# Patient Record
Sex: FEMALE | Race: ASIAN | NOT HISPANIC OR LATINO
[De-identification: names, ages, dates, MRNs, and addresses within clinical notes are randomized per-mention and may not be internally consistent; named-entity substitution may affect disease eponyms.]

---

## 2017-11-28 PROBLEM — Z00.00 ENCOUNTER FOR PREVENTIVE HEALTH EXAMINATION: Status: ACTIVE | Noted: 2017-11-28

## 2018-01-23 ENCOUNTER — APPOINTMENT (OUTPATIENT)
Dept: GASTROENTEROLOGY | Facility: CLINIC | Age: 68
End: 2018-01-23
Payer: MEDICARE

## 2018-01-23 VITALS
TEMPERATURE: 98.1 F | DIASTOLIC BLOOD PRESSURE: 70 MMHG | SYSTOLIC BLOOD PRESSURE: 130 MMHG | WEIGHT: 206 LBS | HEIGHT: 64 IN | BODY MASS INDEX: 35.17 KG/M2

## 2018-01-23 DIAGNOSIS — Z78.9 OTHER SPECIFIED HEALTH STATUS: ICD-10-CM

## 2018-01-23 DIAGNOSIS — Z86.39 PERSONAL HISTORY OF OTHER ENDOCRINE, NUTRITIONAL AND METABOLIC DISEASE: ICD-10-CM

## 2018-01-23 DIAGNOSIS — Z12.11 ENCOUNTER FOR SCREENING FOR MALIGNANT NEOPLASM OF COLON: ICD-10-CM

## 2018-01-23 DIAGNOSIS — Z87.19 PERSONAL HISTORY OF OTHER DISEASES OF THE DIGESTIVE SYSTEM: ICD-10-CM

## 2018-01-23 DIAGNOSIS — Z82.49 FAMILY HISTORY OF ISCHEMIC HEART DISEASE AND OTHER DISEASES OF THE CIRCULATORY SYSTEM: ICD-10-CM

## 2018-01-23 DIAGNOSIS — R10.9 UNSPECIFIED ABDOMINAL PAIN: ICD-10-CM

## 2018-01-23 DIAGNOSIS — F15.90 OTHER STIMULANT USE, UNSPECIFIED, UNCOMPLICATED: ICD-10-CM

## 2018-01-23 DIAGNOSIS — Z86.79 PERSONAL HISTORY OF OTHER DISEASES OF THE CIRCULATORY SYSTEM: ICD-10-CM

## 2018-01-23 DIAGNOSIS — K59.00 CONSTIPATION, UNSPECIFIED: ICD-10-CM

## 2018-01-23 DIAGNOSIS — Z83.3 FAMILY HISTORY OF DIABETES MELLITUS: ICD-10-CM

## 2018-01-23 PROCEDURE — 99204 OFFICE O/P NEW MOD 45 MIN: CPT

## 2018-01-23 RX ORDER — METFORMIN HYDROCHLORIDE 500 MG/1
500 TABLET, COATED ORAL DAILY
Refills: 0 | Status: ACTIVE | COMMUNITY

## 2018-01-23 RX ORDER — LEVOTHYROXINE SODIUM 0.12 MG/1
125 TABLET ORAL DAILY
Refills: 0 | Status: ACTIVE | COMMUNITY

## 2018-01-31 LAB — HEMOCCULT STL QL IA: NEGATIVE

## 2018-02-15 ENCOUNTER — APPOINTMENT (OUTPATIENT)
Dept: GASTROENTEROLOGY | Facility: AMBULATORY MEDICAL SERVICES | Age: 68
End: 2018-02-15
Payer: MEDICARE

## 2018-02-15 PROCEDURE — 45380 COLONOSCOPY AND BIOPSY: CPT

## 2018-02-15 PROCEDURE — 43239 EGD BIOPSY SINGLE/MULTIPLE: CPT

## 2018-03-27 ENCOUNTER — APPOINTMENT (OUTPATIENT)
Dept: GASTROENTEROLOGY | Facility: CLINIC | Age: 68
End: 2018-03-27
Payer: MEDICARE

## 2018-03-27 ENCOUNTER — APPOINTMENT (OUTPATIENT)
Dept: GASTROENTEROLOGY | Facility: CLINIC | Age: 68
End: 2018-03-27

## 2018-03-27 VITALS
DIASTOLIC BLOOD PRESSURE: 80 MMHG | BODY MASS INDEX: 35.17 KG/M2 | TEMPERATURE: 98.2 F | SYSTOLIC BLOOD PRESSURE: 140 MMHG | HEIGHT: 64 IN | WEIGHT: 206 LBS

## 2018-03-27 DIAGNOSIS — Z09 ENCOUNTER FOR FOLLOW-UP EXAMINATION AFTER COMPLETED TREATMENT FOR CONDITIONS OTHER THAN MALIGNANT NEOPLASM: ICD-10-CM

## 2018-03-27 DIAGNOSIS — E66.9 OBESITY, UNSPECIFIED: ICD-10-CM

## 2018-03-27 DIAGNOSIS — K21.9 GASTRO-ESOPHAGEAL REFLUX DISEASE W/OUT ESOPHAGITIS: ICD-10-CM

## 2018-03-27 DIAGNOSIS — K63.89 OTHER SPECIFIED DISEASES OF INTESTINE: ICD-10-CM

## 2018-03-27 PROCEDURE — 99214 OFFICE O/P EST MOD 30 MIN: CPT

## 2018-04-22 ENCOUNTER — RX RENEWAL (OUTPATIENT)
Age: 68
End: 2018-04-22

## 2018-05-01 ENCOUNTER — APPOINTMENT (OUTPATIENT)
Dept: GASTROENTEROLOGY | Facility: CLINIC | Age: 68
End: 2018-05-01
Payer: MEDICARE

## 2018-05-01 DIAGNOSIS — D50.9 IRON DEFICIENCY ANEMIA, UNSPECIFIED: ICD-10-CM

## 2018-05-01 PROCEDURE — 91110 GI TRC IMG INTRAL ESOPH-ILE: CPT

## 2018-05-08 PROBLEM — D50.9 IRON DEFICIENCY ANEMIA: Status: ACTIVE | Noted: 2018-01-23

## 2018-08-17 ENCOUNTER — APPOINTMENT (OUTPATIENT)
Dept: SURGERY | Facility: CLINIC | Age: 68
End: 2018-08-17
Payer: MEDICARE

## 2018-08-17 VITALS
HEART RATE: 71 BPM | HEIGHT: 64 IN | BODY MASS INDEX: 35 KG/M2 | TEMPERATURE: 98.2 F | WEIGHT: 205 LBS | OXYGEN SATURATION: 95 %

## 2018-08-17 DIAGNOSIS — Z86.39 PERSONAL HISTORY OF OTHER ENDOCRINE, NUTRITIONAL AND METABOLIC DISEASE: ICD-10-CM

## 2018-08-17 PROCEDURE — 99204 OFFICE O/P NEW MOD 45 MIN: CPT

## 2018-08-27 ENCOUNTER — OUTPATIENT (OUTPATIENT)
Dept: OUTPATIENT SERVICES | Facility: HOSPITAL | Age: 68
LOS: 1 days | Discharge: HOME | End: 2018-08-27

## 2018-08-27 ENCOUNTER — RESULT REVIEW (OUTPATIENT)
Age: 68
End: 2018-08-27

## 2018-08-27 ENCOUNTER — APPOINTMENT (OUTPATIENT)
Dept: SURGERY | Facility: CLINIC | Age: 68
End: 2018-08-27
Payer: MEDICARE

## 2018-08-27 DIAGNOSIS — E04.1 NONTOXIC SINGLE THYROID NODULE: ICD-10-CM

## 2018-08-27 PROCEDURE — 76942 ECHO GUIDE FOR BIOPSY: CPT

## 2018-08-27 PROCEDURE — 10022: CPT

## 2018-08-31 ENCOUNTER — RESULT REVIEW (OUTPATIENT)
Age: 68
End: 2018-08-31

## 2019-08-26 ENCOUNTER — APPOINTMENT (OUTPATIENT)
Dept: SURGERY | Facility: CLINIC | Age: 69
End: 2019-08-26
Payer: MEDICARE

## 2019-09-02 PROBLEM — Z09 FOLLOW UP: Status: ACTIVE | Noted: 2018-03-27

## 2019-09-06 ENCOUNTER — APPOINTMENT (OUTPATIENT)
Dept: SURGERY | Facility: CLINIC | Age: 69
End: 2019-09-06
Payer: MEDICARE

## 2019-09-06 DIAGNOSIS — E04.1 NONTOXIC SINGLE THYROID NODULE: ICD-10-CM

## 2019-09-06 DIAGNOSIS — Z95.5 PRESENCE OF CORONARY ANGIOPLASTY IMPLANT AND GRAFT: ICD-10-CM

## 2019-09-06 PROCEDURE — 99213 OFFICE O/P EST LOW 20 MIN: CPT

## 2019-09-06 NOTE — PHYSICAL EXAM
[Midline] : located in midline position [Normal] : orientation to person, place, and time: normal [de-identified] : voice clear

## 2020-02-11 ENCOUNTER — APPOINTMENT (OUTPATIENT)
Dept: VASCULAR SURGERY | Facility: CLINIC | Age: 70
End: 2020-02-11
Payer: MEDICARE

## 2020-02-11 VITALS — DIASTOLIC BLOOD PRESSURE: 73 MMHG | SYSTOLIC BLOOD PRESSURE: 137 MMHG | HEART RATE: 74 BPM

## 2020-02-11 DIAGNOSIS — Z86.79 PERSONAL HISTORY OF OTHER DISEASES OF THE CIRCULATORY SYSTEM: ICD-10-CM

## 2020-02-11 DIAGNOSIS — Z86.39 PERSONAL HISTORY OF OTHER ENDOCRINE, NUTRITIONAL AND METABOLIC DISEASE: ICD-10-CM

## 2020-02-11 PROCEDURE — 99204 OFFICE O/P NEW MOD 45 MIN: CPT

## 2020-02-11 PROCEDURE — 93880 EXTRACRANIAL BILAT STUDY: CPT

## 2020-02-11 RX ORDER — METFORMIN ER 500 MG 500 MG/1
500 TABLET ORAL
Qty: 30 | Refills: 0 | Status: DISCONTINUED | COMMUNITY
Start: 2017-11-30 | End: 2020-02-11

## 2020-02-11 RX ORDER — ATORVASTATIN CALCIUM 20 MG/1
20 TABLET, FILM COATED ORAL
Refills: 0 | Status: ACTIVE | COMMUNITY

## 2020-02-11 RX ORDER — DILTIAZEM HYDROCHLORIDE 120 MG/1
120 CAPSULE, EXTENDED RELEASE ORAL
Qty: 90 | Refills: 0 | Status: DISCONTINUED | COMMUNITY
Start: 2018-02-20 | End: 2020-02-11

## 2020-02-11 RX ORDER — OMEPRAZOLE 20 MG/1
20 CAPSULE, DELAYED RELEASE ORAL
Refills: 0 | Status: ACTIVE | COMMUNITY

## 2020-02-11 RX ORDER — FOLIC ACID 1 MG/1
1 TABLET ORAL
Refills: 0 | Status: ACTIVE | COMMUNITY

## 2020-02-11 RX ORDER — DILTIAZEM HYDROCHLORIDE 120 MG/1
120 TABLET ORAL
Refills: 0 | Status: DISCONTINUED | COMMUNITY
End: 2020-02-11

## 2020-02-11 RX ORDER — CYCLOBENZAPRINE HYDROCHLORIDE 10 MG/1
10 TABLET, FILM COATED ORAL
Refills: 0 | Status: DISCONTINUED | COMMUNITY
End: 2020-02-11

## 2020-02-11 RX ORDER — PREGABALIN 50 MG/1
50 CAPSULE ORAL
Qty: 30 | Refills: 0 | Status: DISCONTINUED | COMMUNITY
Start: 2018-02-19 | End: 2020-02-11

## 2020-02-11 RX ORDER — ASPIRIN ENTERIC COATED TABLETS 81 MG 81 MG/1
81 TABLET, DELAYED RELEASE ORAL
Refills: 0 | Status: ACTIVE | COMMUNITY

## 2020-02-11 RX ORDER — DULAGLUTIDE 1.5 MG/.5ML
1.5 INJECTION, SOLUTION SUBCUTANEOUS
Refills: 0 | Status: ACTIVE | COMMUNITY

## 2020-02-11 RX ORDER — SERTRALINE HYDROCHLORIDE 50 MG/1
50 TABLET, FILM COATED ORAL DAILY
Refills: 0 | Status: DISCONTINUED | COMMUNITY
End: 2020-02-11

## 2020-02-11 RX ORDER — MELOXICAM 7.5 MG/1
7.5 TABLET ORAL
Refills: 0 | Status: DISCONTINUED | COMMUNITY
End: 2020-02-11

## 2020-02-11 RX ORDER — LOSARTAN POTASSIUM 50 MG/1
50 TABLET, FILM COATED ORAL
Refills: 0 | Status: ACTIVE | COMMUNITY

## 2020-02-11 RX ORDER — IRON POLYSACCHARIDE COMPLEX 150 MG
150 CAPSULE ORAL
Qty: 30 | Refills: 5 | Status: DISCONTINUED | COMMUNITY
Start: 2018-01-23 | End: 2020-02-11

## 2020-02-11 RX ORDER — NEBIVOLOL HYDROCHLORIDE 10 MG/1
10 TABLET ORAL
Refills: 0 | Status: ACTIVE | COMMUNITY

## 2020-02-11 RX ORDER — OMEPRAZOLE 20 MG/1
20 CAPSULE, DELAYED RELEASE ORAL DAILY
Qty: 30 | Refills: 0 | Status: DISCONTINUED | COMMUNITY
End: 2020-02-11

## 2020-02-11 RX ORDER — LABETALOL HYDROCHLORIDE 300 MG/1
300 TABLET, FILM COATED ORAL
Qty: 60 | Refills: 0 | Status: DISCONTINUED | COMMUNITY
Start: 2017-05-31 | End: 2020-02-11

## 2020-02-28 NOTE — PHYSICAL EXAM
[Normal Breath Sounds] : Normal breath sounds [Respiratory Effort] : normal respiratory effort [Normal Heart Sounds] : normal heart sounds [Normal Rate and Rhythm] : normal rate and rhythm [2+] : left 2+ [No Rash or Lesion] : No rash or lesion [Alert] : alert [Oriented to Person] : oriented to person [Oriented to Place] : oriented to place [Oriented to Time] : oriented to time [Calm] : calm [JVD] : no jugular venous distention  [Carotid Bruits] : no carotid bruits [Right Carotid Bruit] : no bruit heard over the right carotid [Left Carotid Bruit] : no bruit heard over the left carotid [Ankle Swelling (On Exam)] : not present [de-identified] : calm, cooperative [de-identified] : FROM. Bilateral equal strong hand  5/5. Ambulating without assistive devices, normal gait [de-identified] : Symmetrical frown and smile. PERR [de-identified] : Symmetrical frown and smile.

## 2020-02-28 NOTE — ASSESSMENT
[FreeTextEntry1] : 70 y/o F with bilateral, asymptomatic carotid stenosis. On exam, she is neurological intact. No carotid bruits appreciated and BP well controlled 137/73 with heart rate 73 b/min. Carotid duplex confirmed L side to have <50% stenosis on ICA and CCA, and R side to have 50-69% stenosis. Pt advised to remain on daily asa, plavix and statin as prescribed. In regards to her headaches, it is not related to the stenosis of the carotid arteries. I have referred her to a neurologist, Dr Nabil Anderson who is close to her house. She is instructed to follow-up every 6 months to monitor the stenosis either at  office or here.

## 2020-02-28 NOTE — HISTORY OF PRESENT ILLNESS
[FreeTextEntry1] : 70 y/o F with PMH of HTN, HLD, CAD s/p stents x2, former smoker, and PSH of cervical spine, knee and ovarian surgeries, who presents to office with recently diagnosed carotid stenosis. She reports she started developing headaches about 6 wks ago. The headaches would not go away with any OTC medications or lifestyle modifications, so she went to her PCP Dr WALESKA Matthews for an evaluation. A MRI was ordered and demonstrated her to have some blockage on the carotid arteries, otherwise negative per her report. She was then sent to Dr KELLEY Matthews's office for evaluation of the carotid arteries. An ultrasound was done and demonstrated L side to have mild stenosis and right side moderate stenosis. Subsequently, she was referred here for evaluation. She denies any neurological symptoms besides the headaches. She has a strong family hx of stroke and is concerned about it. She reports her BP to be relatively controlled, max reports SBP 140s. She is on daily plavix, asa, and statin.\par \par She is accompanied by her daughter. \par \par FHx:\par Father: passed, hx of cirrhosis, alcoholism\par Mother: passed, hx of stroke, htn, copd\par Sister: passed, stroke, alcoholism\par Brother: passed, sepsis 2/2 liver abscess, cirrhosis, alcoholism\par Sister: alive, hx of htn\par \par SHx:\par Former 20yrs smoker\par Social alcohol use\par Housewife

## 2020-02-28 NOTE — DATA REVIEWED
[FreeTextEntry1] : 2/3/20: carotid duplex from outside, demonstrates R ICA 60-79% stenosis, mild on the L ICA. See under outside medical records full report

## 2020-12-16 PROBLEM — Z12.11 ENCOUNTER FOR SCREENING COLONOSCOPY: Status: RESOLVED | Noted: 2018-01-23 | Resolved: 2020-12-16

## 2021-06-21 ENCOUNTER — APPOINTMENT (OUTPATIENT)
Dept: ORTHOPEDIC SURGERY | Facility: CLINIC | Age: 71
End: 2021-06-21
Payer: MEDICARE

## 2021-06-21 VITALS — BODY MASS INDEX: 32.44 KG/M2 | WEIGHT: 190 LBS | HEIGHT: 64 IN

## 2021-06-21 VITALS — HEART RATE: 80 BPM | SYSTOLIC BLOOD PRESSURE: 147 MMHG | DIASTOLIC BLOOD PRESSURE: 66 MMHG

## 2021-06-21 PROCEDURE — 73030 X-RAY EXAM OF SHOULDER: CPT | Mod: RT

## 2021-06-21 PROCEDURE — 99203 OFFICE O/P NEW LOW 30 MIN: CPT

## 2021-06-21 PROCEDURE — 99072 ADDL SUPL MATRL&STAF TM PHE: CPT

## 2021-06-21 NOTE — DISCUSSION/SUMMARY
[de-identified] : 69 y/o female with right shoulder pain\par \par Patient presents for evaluation of right shoulder pain which is consistent with rotator cuff disease as well as early arthrosis.  Patient has some loss of motion as well as weakness to the right upper extremity consistent with MRI that shows complete full-thickness tear of the supraspinatus tendon with retraction of the torn free edge of the tendon to the level of the glenohumeral joint line. A discussion was had with the patient regarding degenerative joint disease that results from rotator cuff injury and loss of joint congruence and glenohumeral wear.  This is consistent with rotator cuff arthropathy which is characterized by the combination of rotator cuff insufficiency as well as glenohumeral cartilage destruction and superior migration of the humeral head.  Discussed that progression is common due to the loss of the compressive effects of the rotator cuff as well as progression of functional decline.  Rotator cuff arthropathy tends to limit range of motion as well as pseudoparalysis.\par \par Rotator cuff arthropathy is classified by the acromiohumeral interval; GI >6mm, GII <5mm, GIII + acetabularization of acromion, GIV GH OA with or without acetabularization, GV humeral head collapse.  \par \par Nonoperative management with activity modification, injection therapy, and physical therapy are the first-line treatments.  Physical therapy is focused on scapular and rotator cuff strengthening in an attempt to maximize function and decrease anterior superior escape.  Surgical intervention can include surgical arthroscopy with unpredictable results, or reverse total shoulder arthroplasty for patients that have pseudoparalysis/anterior superior escape with a functioning axillary nerve.\par \par Recommendation: Begin trial of PT, Rx given. Acetaminophen as tolerated, with application of ice to the area 2-3x daily for 20 minutes after periods of activity. \par \par Follow-up 3 months, follow-up orthopedic spine for concurrent cervical dysfunction

## 2021-06-21 NOTE — ADDENDUM
[FreeTextEntry1] : This note was written by Virgen Kerr on 06/21/2021 acting solely as a scribe for Dr. Yobani Galvan.\par \par All medical record entries made by the Scribe were at my, Dr. Yobani Galvan, direction and personally dictated by me on 06/21/2021. I have personally reviewed the chart and agree that the record accurately reflects my personal performance of the history, physical exam, assessment and plan.

## 2021-06-21 NOTE — HISTORY OF PRESENT ILLNESS
[de-identified] : 70 year old female presents today with right shoulder pain since January 2021. She fell and slipped on snow in January on her right side. She was seen by an orthopedic at [facility] a month ago when pain did not resolve. Patient obtained MRI which showed a tear but was unable to follow up because the physician did not accept her insurance. She was referred here by PMD. The pain is intermittent brought on with weather changes and reaching overhead.  She reports the pain localizes in the neck and radiates down the arm.  She also reports sharp right shoulder pain.  NSAIDs cause her GI upset so she is taking Tylenol which gives her temporary relief. \par \par The patient's past medical history, past surgical history, medications and allergies were reviewed by me today with the patient and documented accordingly. In addition, the patient's family and social history, which were noncontributory to this visit, were reviewed also.

## 2021-06-29 ENCOUNTER — APPOINTMENT (OUTPATIENT)
Dept: PHYSICAL MEDICINE AND REHAB | Facility: CLINIC | Age: 71
End: 2021-06-29
Payer: MEDICARE

## 2021-06-29 VITALS
SYSTOLIC BLOOD PRESSURE: 157 MMHG | TEMPERATURE: 97.3 F | HEART RATE: 78 BPM | DIASTOLIC BLOOD PRESSURE: 80 MMHG | OXYGEN SATURATION: 98 %

## 2021-06-29 DIAGNOSIS — M48.02 SPINAL STENOSIS, CERVICAL REGION: ICD-10-CM

## 2021-06-29 DIAGNOSIS — M54.2 CERVICALGIA: ICD-10-CM

## 2021-06-29 DIAGNOSIS — G56.03 CARPAL TUNNEL SYNDROM,BILATERAL UPPER LIMBS: ICD-10-CM

## 2021-06-29 DIAGNOSIS — M54.12 RADICULOPATHY, CERVICAL REGION: ICD-10-CM

## 2021-06-29 PROCEDURE — 99204 OFFICE O/P NEW MOD 45 MIN: CPT

## 2021-06-29 PROCEDURE — 99072 ADDL SUPL MATRL&STAF TM PHE: CPT

## 2021-06-29 NOTE — PHYSICAL EXAM
[FreeTextEntry1] : Gen: NAD\par Neck: supple, +spasm lower (L > R) cervical paraspinals and upper trapezius bilaterally, ROM limited by pain, pos spurling bilaterally\par Right shoulder: ROM limited by pain, +neer's, +hawkin's, neg apprehension, neg speed's, neg drop arm\par CV: no cyanosis\par Pulm: breathing well on room air\par Abd: soft\par Msk: \par 5/5 hip flexion B/L, 5/5 knee extension B/L, 5/5 knee flexion B/L, 5/5 dorsiflexion B/L, 5/5 plantar flexion B/L\par 5/5 shoulder abduction B/L, 5/5 elbow flexion B/L, 5/5 elbow extension B/L, 5/5 wrist extension B/L, 5/5 hand  B/L\par Neuro: sensation intact to light touch in bilateral upper and lower extremities, reflexes 2+ brachioradialis, biceps, triceps bilaterally, reflexes 2+ patella, medial hamstring, achilles bilaterally, negative babinski, negative bauman\par

## 2021-06-29 NOTE — ASSESSMENT
[FreeTextEntry1] : 71 yo F who presents with neck pain with radiation into bilateral upper extremities consistent with cervical radiculopathy, cervicalgia and cervical myofascial pain syndrome.  Radicular symptoms into the hand may be secondary to CTS vs. cervical radiculopathy but this is noted to be a less prominent feature of her pain.\par \par -Orthopedic notes reviewed, MRI right shoulder reviewed\par -Encourage patient to start PT/HEP for right shoulder pain.  I added a referral for neck pain as well, however I've asked the patient to communicate to her PT (outside Health system) that right shoulder pain should be prioritized at this time.\par -Start methocarbamol 500 mg PO qHS prn pain.  Patient warned of the sedating nature of the medication and instructed not to drive or handle heavy machinery.\par -RTC 6 weeks, If pain persists or worsen despite compliance with above, then will consider MRI cervical spine w/o contrast at next visit.\par \par Isac Seth MD\par Spine and Sports Medicine\par \par [employer]\par At [facility]\par \par

## 2021-06-29 NOTE — HISTORY OF PRESENT ILLNESS
[FreeTextEntry1] : 71 yo F with PMH GERD, DM w/ recent HgbA1c 6, HTN, HLD, CAD s/p stent placement, CTS who presents with neck pain.\par \par Onset: 2007 with recent flare after a mechanical fall in 1/2021.  No inciting events, trauma, or falls.\par Location: lower cervical spine\par Characteristics: sharp \par Aggravating factors: neck movements\par Alleviating factors: rest\par Radiation: bilateral upper extremities\par Treatments: tylenol, oxycodone with some relief, Gabapentin 100 mg PO qHS with minimal relief in her pain, rest, no recent physical therapy, HEP; cervical spine fusion surgery in 2007\par Severity: 7-9/10\par \par Diagnostic studies:\par MRI right shoulder showing complete full-thickness tear of the supraspinatus, moderte infraspinatus and subscapularis tendinopathy, complete tear of the bicep tendon.\par No recent MRI cervical spine\par EMG/NCS shows evidence of carpal tunnel syndrome several years ago.\par \par Patient denies new weakness, numbness or paresthesia.  Patient denies bowel/bladder dysfunction, fevers, chills, weight loss, night pain, or night sweats.\par

## 2021-10-04 ENCOUNTER — APPOINTMENT (OUTPATIENT)
Dept: ORTHOPEDIC SURGERY | Facility: CLINIC | Age: 71
End: 2021-10-04
Payer: MEDICARE

## 2021-10-04 PROCEDURE — 99214 OFFICE O/P EST MOD 30 MIN: CPT

## 2021-10-04 NOTE — PHYSICAL EXAM
[de-identified] : Oriented to time, place, person\par Mood: Normal\par Affect: Normal\par Appearance: Healthy, well appearing, no acute distress.\par Gait: Normal\par Assistive Devices: None\par \par Right shoulder exam:\par \par Inspection: No malalignment, No defects, No atrophy\par Skin: No masses, No lesions\par Neck: Negative Spurling, full ROM, no pain with ROM\par AROM: FF to 160, abduction to 90, ER to 45, IR to mid lumbar\par Painful arc ROM: Pain with IR\par Tenderness: No bicipital tenderness, mild tenderness to greater tuberosity/RTC insertion, no anterior shoulder/lesser tuberosity tenderness positive trapezius pain\par Strength: 4/5 ER, 4+/5 IR in adduction, 3/5 supraspinatus testing, +drop arm test negative Clarendon's test\par AC joint: No TTP/pain with cross arm testing\par Biceps: Speed Negative, Yergason Negative \par Impingement test: + Ann, + Neer\par Vasc: 2+ radial pulse \par Stability: Stable \par Neuro: AIN, PIN, Ulnar nerve intact to motor\par Sensation: Intact to light touch throughout  [de-identified] : Images were reviewed from [facility] dated 5.10.2021.\par \par 3 views of right shoulder were obtained that show no acute fracture or dislocation. There is mild glenohumeral and moderate AC joint degenerative change seen. Type II acromion. There is no significant malalignment. No significant other obvious osseous abnormality, otherwise unremarkable. \par \par MRI right shoulder dated 6.4.2021 shows complete full-thickness tear of the supraspinatus tendon with retraction of the torn free edge of the tendon to the level of the glenohumeral joint line. Complete tear of the intra-articular portion of the long head of the biceps tendon.

## 2021-10-04 NOTE — ADDENDUM
[FreeTextEntry1] : This note was written by Virgen Kerr on 10/04/2021 acting solely as a scribe for Dr. Yobani Galvan.\par \par All medical record entries made by the Scribe were at my, Dr. Yobani Galvan, direction and personally dictated by me on 10/04/2021. I have personally reviewed the chart and agree that the record accurately reflects my personal performance of the history, physical exam, assessment and plan.

## 2021-10-04 NOTE — HISTORY OF PRESENT ILLNESS
[de-identified] : 71 year old female presents today for follow up of right shoulder rotator cuff tear. Attending PT 2 x per week without improvement. She fell and slipped on snow in January 2021 on her right side and pain radiating through the upper extremity. Seen PM&R for neck pain she was given PT script for neck and methocarbamol 500 mg. Her pain is intermittent brought on with weather changes and reaching overhead.  She reports sharp right shoulder pain that begins as soon as she starts using it.  NSAIDs cause her GI upset so she is taking Tylenol which gives her temporary relief. Considering alternative options as physical therapy seems to be exacerbating her shoulder symptoms.

## 2021-10-04 NOTE — DISCUSSION/SUMMARY
[de-identified] : 72 y/o female with right shoulder RTCT\par \par Patient presents for follow-up of right shoulder pain which is consistent with rotator cuff disease as well as early arthrosis.  Patient has some loss of motion as well as weakness to the right upper extremity consistent with MRI that shows complete full-thickness tear of the supraspinatus tendon with retraction of the torn free edge of the tendon to the level of the glenohumeral joint line. Patient is frustrated with her progression of conservative management. We discussed early rotator cuff arthropathy given degree of rotator cuff involvement. I discussed continued conservative management versus arthroscopic intervention versus revTSA. I do not believe that she is a candidate for arthroplasty at this time as her arthrosis is not severe. Arthroscopic intervention with attempted rotator cuff repair versus superior capsular reconstruction may provide some improvement of current symptoms, but there is concerned given early arthrosis.\par \par Patient is electing for surgical treatment with arthroscopy and rotator cuff repair.  All risks, benefits and alternatives to the proposed surgical procedure, right shoulder arthroscopy with attempted rotator cuff repair versus SCR, as well as the need for formal post-operative rehabilitation were discussed in great detail with the patient. Risks include but are not limited to pain, bleeding, infection, neurovascular injury, stiffness, failure, loss of motion, future arthroplasty, medical complications (including DVT, PE, MI), and risks of anesthesia. \par \par A discussion was also had with the patient regarding additional precautions during surgery given the recent Covid-19 pandemic; specifically with regards to perioperative care, visitor policy, and pre-admission testing. The patient understands that current protocol requires either documented Covid-19 vaccination or a negative Covid-19 test no more than 48hrs prior to surgery. \par \par The patient expressed understanding and all questions were answered. The patient is electing to proceed, and will have the patient scheduled accordingly.

## 2021-10-08 ENCOUNTER — OUTPATIENT (OUTPATIENT)
Dept: OUTPATIENT SERVICES | Facility: HOSPITAL | Age: 71
LOS: 1 days | End: 2021-10-08
Payer: COMMERCIAL

## 2021-10-08 VITALS
HEART RATE: 81 BPM | HEIGHT: 62 IN | TEMPERATURE: 97 F | SYSTOLIC BLOOD PRESSURE: 148 MMHG | DIASTOLIC BLOOD PRESSURE: 78 MMHG | WEIGHT: 195.99 LBS | RESPIRATION RATE: 18 BRPM | OXYGEN SATURATION: 97 %

## 2021-10-08 DIAGNOSIS — I25.10 ATHEROSCLEROTIC HEART DISEASE OF NATIVE CORONARY ARTERY WITHOUT ANGINA PECTORIS: Chronic | ICD-10-CM

## 2021-10-08 DIAGNOSIS — M75.121 COMPLETE ROTATOR CUFF TEAR OR RUPTURE OF RIGHT SHOULDER, NOT SPECIFIED AS TRAUMATIC: ICD-10-CM

## 2021-10-08 DIAGNOSIS — Z64.1 PROBLEMS RELATED TO MULTIPARITY: Chronic | ICD-10-CM

## 2021-10-08 DIAGNOSIS — E11.9 TYPE 2 DIABETES MELLITUS WITHOUT COMPLICATIONS: ICD-10-CM

## 2021-10-08 DIAGNOSIS — M67.921 UNSPECIFIED DISORDER OF SYNOVIUM AND TENDON, RIGHT UPPER ARM: ICD-10-CM

## 2021-10-08 DIAGNOSIS — M12.9 ARTHROPATHY, UNSPECIFIED: ICD-10-CM

## 2021-10-08 DIAGNOSIS — Z98.890 OTHER SPECIFIED POSTPROCEDURAL STATES: Chronic | ICD-10-CM

## 2021-10-08 DIAGNOSIS — M12.9 ARTHROPATHY, UNSPECIFIED: Chronic | ICD-10-CM

## 2021-10-08 LAB
A1C WITH ESTIMATED AVERAGE GLUCOSE RESULT: 5.6 % — SIGNIFICANT CHANGE UP (ref 4–5.6)
ANION GAP SERPL CALC-SCNC: 14 MMOL/L — SIGNIFICANT CHANGE UP (ref 7–14)
BUN SERPL-MCNC: 10 MG/DL — SIGNIFICANT CHANGE UP (ref 7–23)
CALCIUM SERPL-MCNC: 9.7 MG/DL — SIGNIFICANT CHANGE UP (ref 8.4–10.5)
CHLORIDE SERPL-SCNC: 100 MMOL/L — SIGNIFICANT CHANGE UP (ref 98–107)
CO2 SERPL-SCNC: 24 MMOL/L — SIGNIFICANT CHANGE UP (ref 22–31)
CREAT SERPL-MCNC: 0.58 MG/DL — SIGNIFICANT CHANGE UP (ref 0.5–1.3)
ESTIMATED AVERAGE GLUCOSE: 114 — SIGNIFICANT CHANGE UP
GLUCOSE SERPL-MCNC: 83 MG/DL — SIGNIFICANT CHANGE UP (ref 70–99)
HCT VFR BLD CALC: 32.6 % — LOW (ref 34.5–45)
HGB BLD-MCNC: 10 G/DL — LOW (ref 11.5–15.5)
MCHC RBC-ENTMCNC: 23.5 PG — LOW (ref 27–34)
MCHC RBC-ENTMCNC: 30.7 GM/DL — LOW (ref 32–36)
MCV RBC AUTO: 76.5 FL — LOW (ref 80–100)
NRBC # BLD: 0 /100 WBCS — SIGNIFICANT CHANGE UP
NRBC # FLD: 0 K/UL — SIGNIFICANT CHANGE UP
PLATELET # BLD AUTO: 232 K/UL — SIGNIFICANT CHANGE UP (ref 150–400)
POTASSIUM SERPL-MCNC: 4.7 MMOL/L — SIGNIFICANT CHANGE UP (ref 3.5–5.3)
POTASSIUM SERPL-SCNC: 4.7 MMOL/L — SIGNIFICANT CHANGE UP (ref 3.5–5.3)
RBC # BLD: 4.26 M/UL — SIGNIFICANT CHANGE UP (ref 3.8–5.2)
RBC # FLD: 15.6 % — HIGH (ref 10.3–14.5)
SODIUM SERPL-SCNC: 138 MMOL/L — SIGNIFICANT CHANGE UP (ref 135–145)
WBC # BLD: 5.54 K/UL — SIGNIFICANT CHANGE UP (ref 3.8–10.5)
WBC # FLD AUTO: 5.54 K/UL — SIGNIFICANT CHANGE UP (ref 3.8–10.5)

## 2021-10-08 PROCEDURE — 93010 ELECTROCARDIOGRAM REPORT: CPT

## 2021-10-08 RX ORDER — DULAGLUTIDE 4.5 MG/.5ML
0 INJECTION, SOLUTION SUBCUTANEOUS
Qty: 0 | Refills: 0 | DISCHARGE

## 2021-10-08 NOTE — H&P PST ADULT - NSICDXPASTMEDICALHX_GEN_ALL_CORE_FT
PAST MEDICAL HISTORY:  Anxiety     Arthropathy right shoulder in 2021    Arthropathy left knee    CAD (coronary artery disease) has 2 cardiac stents    COPD, mild     GERD (gastroesophageal reflux disease)     Hypercholesterolemia     Hypertension     Hypothyroidism

## 2021-10-08 NOTE — H&P PST ADULT - HISTORY OF PRESENT ILLNESS
This is a 70 y/o female who presents with progressively worsening of right shoulder arthropathy confirmed on xrays and MRI. Unsuccessful Physical therapy. Scheduled for right shoulder major debridement arthroscopic rotator cuff repair possible superior capsular  reconstruction

## 2021-10-08 NOTE — H&P PST ADULT - NSICDXPASTSURGICALHX_GEN_ALL_CORE_FT
PAST SURGICAL HISTORY:  Arthropathy left knee surgery     delivery delivered and     H/O cervical spine surgery fusion in     H/O induced  D&C    H/O ovarian cystectomy left side 15 years ago    Multiparity b/l tubal ligation     PAST SURGICAL HISTORY:  Arthropathy left knee surgery     delivery delivered and     Coronary artery disease has h/o 2 cardiac stents    H/O cervical spine surgery fusion in     H/O induced  D&C    H/O ovarian cystectomy left side 15 years ago    Multiparity b/l tubal ligation

## 2021-10-08 NOTE — H&P PST ADULT - NSANTHOSAYNRD_GEN_A_CORE
No. ADINA screening performed.  STOP BANG Legend: 0-2 = LOW Risk; 3-4 = INTERMEDIATE Risk; 5-8 = HIGH Risk

## 2021-10-08 NOTE — H&P PST ADULT - PROBLEM SELECTOR PLAN 1
This is a 70 y/o female who is scheduled for right shoulder major debridement arthroscopic rotator cuff repair possible superior capsular reconstruction on 10-20-21  * Instructed to speak with surgeon regarding covid testing  * Given preop and cleanser instructions with good teach back and patient verbalized understanding

## 2021-10-16 DIAGNOSIS — Z01.818 ENCOUNTER FOR OTHER PREPROCEDURAL EXAMINATION: ICD-10-CM

## 2021-10-17 ENCOUNTER — APPOINTMENT (OUTPATIENT)
Dept: DISASTER EMERGENCY | Facility: CLINIC | Age: 71
End: 2021-10-17

## 2021-10-18 LAB — SARS-COV-2 N GENE NPH QL NAA+PROBE: NOT DETECTED

## 2021-10-19 ENCOUNTER — TRANSCRIPTION ENCOUNTER (OUTPATIENT)
Age: 71
End: 2021-10-19

## 2021-10-19 VITALS
OXYGEN SATURATION: 99 % | HEART RATE: 57 BPM | DIASTOLIC BLOOD PRESSURE: 65 MMHG | RESPIRATION RATE: 16 BRPM | SYSTOLIC BLOOD PRESSURE: 140 MMHG | HEIGHT: 62 IN | TEMPERATURE: 97 F | WEIGHT: 195.99 LBS

## 2021-10-19 NOTE — ASU PREOPERATIVE ASSESSMENT, ADULT (IPARK ONLY) - PERIPHERAL IV: INSERTION DATE
[facility]  [address]  [address]  [place]  [address]  Tel: [phone]    December 16, 2019    Amna Hickman  [address] [address]        Dear Ms. Hickman,    I am happy to report that your cbc or complete blood count is normal with no signs of anemia, leukemia or platelet abnormalities. Your chemistry panel shows no signs of diabetes.  Your blood salts, kidney tests, liver tests, and proteins are all fine.    Your total cholesterol is 257 with the normal range being below 200.  Your HDL or good cholesterol is 102 with the normal range being above 50.  Your LDL or bad cholesterol is 144 with the normal range being below 130.  As always, given the very large amount of HDL cholesterol I am not concerned about the total or LDL.  Overall the numbers are very good.    I am happy to bring you this excellent report.    If you have any further questions or problems, please contact our office.      Sincerely,    Wilber Greer MD/ Verónica Morel CMA  Results for orders placed or performed in visit on 12/13/19   CBC with platelets     Status: None   Result Value Ref Range    WBC 6.1 4.0 - 11.0 10e9/L    RBC Count 4.81 3.8 - 5.2 10e12/L    Hemoglobin 13.9 11.7 - 15.7 g/dL    Hematocrit 42.2 35.0 - 47.0 %    MCV 88 78 - 100 fl    MCH 28.9 26.5 - 33.0 pg    MCHC 32.9 31.5 - 36.5 g/dL    RDW 14.5 10.0 - 15.0 %    Platelet Count 238 150 - 450 10e9/L   Comprehensive metabolic panel     Status: None   Result Value Ref Range    Sodium 137 133 - 144 mmol/L    Potassium 3.8 3.4 - 5.3 mmol/L    Chloride 102 94 - 109 mmol/L    Carbon Dioxide 30 20 - 32 mmol/L    Anion Gap 5 3 - 14 mmol/L    Glucose 89 70 - 99 mg/dL    Urea Nitrogen 18 7 - 30 mg/dL    Creatinine 0.55 0.52 - 1.04 mg/dL    GFR Estimate >90 >60 mL/min/[1.73_m2]    GFR Estimate If Black >90 >60 mL/min/[1.73_m2]    Calcium 8.9 8.5 - 10.1 mg/dL    Bilirubin Total 0.6 0.2 - 1.3 mg/dL    Albumin 3.8 3.4 - 5.0 g/dL     Protein Total 7.4 6.8 - 8.8 g/dL    Alkaline Phosphatase 71 40 - 150 U/L    ALT 34 0 - 50 U/L    AST 24 0 - 45 U/L   Lipid panel reflex to direct LDL Non-fasting     Status: Abnormal   Result Value Ref Range    Cholesterol 257 (H) <200 mg/dL    Triglycerides 57 <150 mg/dL    HDL Cholesterol 102 >49 mg/dL    LDL Cholesterol Calculated 144 (H) <100 mg/dL    Non HDL Cholesterol 155 (H) <130 mg/dL               Enclosure: Lab Results     20-Oct-2021

## 2021-10-20 ENCOUNTER — APPOINTMENT (OUTPATIENT)
Dept: ORTHOPEDIC SURGERY | Facility: AMBULATORY SURGERY CENTER | Age: 71
End: 2021-10-20

## 2021-10-20 ENCOUNTER — OUTPATIENT (OUTPATIENT)
Dept: OUTPATIENT SERVICES | Facility: HOSPITAL | Age: 71
LOS: 1 days | Discharge: HOME | End: 2021-10-20
Payer: MEDICARE

## 2021-10-20 VITALS
OXYGEN SATURATION: 97 % | TEMPERATURE: 98 F | SYSTOLIC BLOOD PRESSURE: 120 MMHG | HEART RATE: 65 BPM | RESPIRATION RATE: 20 BRPM | DIASTOLIC BLOOD PRESSURE: 65 MMHG

## 2021-10-20 DIAGNOSIS — Z98.890 OTHER SPECIFIED POSTPROCEDURAL STATES: Chronic | ICD-10-CM

## 2021-10-20 DIAGNOSIS — M12.9 ARTHROPATHY, UNSPECIFIED: Chronic | ICD-10-CM

## 2021-10-20 DIAGNOSIS — M75.121 COMPLETE ROTATOR CUFF TEAR OR RUPTURE OF RIGHT SHOULDER, NOT SPECIFIED AS TRAUMATIC: ICD-10-CM

## 2021-10-20 DIAGNOSIS — Z64.1 PROBLEMS RELATED TO MULTIPARITY: Chronic | ICD-10-CM

## 2021-10-20 DIAGNOSIS — I25.10 ATHEROSCLEROTIC HEART DISEASE OF NATIVE CORONARY ARTERY WITHOUT ANGINA PECTORIS: Chronic | ICD-10-CM

## 2021-10-20 LAB
GLUCOSE BLDC GLUCOMTR-MCNC: 117 MG/DL — HIGH (ref 70–99)
GLUCOSE BLDC GLUCOMTR-MCNC: 94 MG/DL — SIGNIFICANT CHANGE UP (ref 70–99)

## 2021-10-20 PROCEDURE — 29823 SHO ARTHRS SRG XTNSV DBRDMT: CPT | Mod: AS

## 2021-10-20 PROCEDURE — 29827 SHO ARTHRS SRG RT8TR CUF RPR: CPT | Mod: AS

## 2021-10-20 PROCEDURE — 29826 SHO ARTHRS SRG DECOMPRESSION: CPT | Mod: AS

## 2021-10-20 PROCEDURE — 29823 SHO ARTHRS SRG XTNSV DBRDMT: CPT | Mod: RT

## 2021-10-20 PROCEDURE — 29827 SHO ARTHRS SRG RT8TR CUF RPR: CPT | Mod: RT

## 2021-10-20 PROCEDURE — 29826 SHO ARTHRS SRG DECOMPRESSION: CPT | Mod: RT

## 2021-10-20 RX ORDER — SODIUM CHLORIDE 9 MG/ML
1000 INJECTION, SOLUTION INTRAVENOUS
Refills: 0 | Status: DISCONTINUED | OUTPATIENT
Start: 2021-10-20 | End: 2021-11-03

## 2021-10-20 NOTE — ASU DISCHARGE PLAN (ADULT/PEDIATRIC) - MEDICATION INSTRUCTIONS
you may take pain medication at 7pm with food you were given tylenol in the operating room and must wait until this time to take pain medication

## 2021-10-20 NOTE — ASU DISCHARGE PLAN (ADULT/PEDIATRIC) - CARE PROVIDER_API CALL
Yobani Galvan)  Orthopedics  [address]  [place] [address]  Phone: [phone]  Fax: [phone]  Follow Up Time:

## 2021-10-20 NOTE — ASU DISCHARGE PLAN (ADULT/PEDIATRIC) - CALL YOUR DOCTOR IF YOU HAVE ANY OF THE FOLLOWING:
Bleeding that does not stop/Fever greater than (need to indicate Fahrenheit or Celsius) Bleeding that does not stop/Swelling that gets worse/Pain not relieved by Medications/Fever greater than (need to indicate Fahrenheit or Celsius)/Wound/Surgical Site with redness, or foul smelling discharge or pus/Numbness, tingling, color or temperature change to extremity/Nausea and vomiting that does not stop/Unable to urinate/Inability to tolerate liquids or foods

## 2021-10-27 PROBLEM — K21.9 GASTRO-ESOPHAGEAL REFLUX DISEASE WITHOUT ESOPHAGITIS: Chronic | Status: ACTIVE | Noted: 2021-10-08

## 2021-10-27 PROBLEM — M12.9 ARTHROPATHY, UNSPECIFIED: Chronic | Status: ACTIVE | Noted: 2021-10-08

## 2021-10-27 PROBLEM — J44.9 CHRONIC OBSTRUCTIVE PULMONARY DISEASE, UNSPECIFIED: Chronic | Status: ACTIVE | Noted: 2021-10-08

## 2021-10-27 PROBLEM — F41.9 ANXIETY DISORDER, UNSPECIFIED: Chronic | Status: ACTIVE | Noted: 2021-10-08

## 2021-10-27 PROBLEM — I10 ESSENTIAL (PRIMARY) HYPERTENSION: Chronic | Status: ACTIVE | Noted: 2021-10-08

## 2021-10-27 PROBLEM — E78.00 PURE HYPERCHOLESTEROLEMIA, UNSPECIFIED: Chronic | Status: ACTIVE | Noted: 2021-10-08

## 2021-10-27 PROBLEM — I25.10 ATHEROSCLEROTIC HEART DISEASE OF NATIVE CORONARY ARTERY WITHOUT ANGINA PECTORIS: Chronic | Status: ACTIVE | Noted: 2021-10-08

## 2021-10-27 PROBLEM — E03.9 HYPOTHYROIDISM, UNSPECIFIED: Chronic | Status: ACTIVE | Noted: 2021-10-08

## 2021-10-28 ENCOUNTER — APPOINTMENT (OUTPATIENT)
Dept: VASCULAR SURGERY | Facility: CLINIC | Age: 71
End: 2021-10-28
Payer: MEDICARE

## 2021-10-28 VITALS — OXYGEN SATURATION: 93 % | DIASTOLIC BLOOD PRESSURE: 82 MMHG | SYSTOLIC BLOOD PRESSURE: 120 MMHG | HEART RATE: 77 BPM

## 2021-10-28 PROCEDURE — 99213 OFFICE O/P EST LOW 20 MIN: CPT

## 2021-11-01 ENCOUNTER — APPOINTMENT (OUTPATIENT)
Dept: ORTHOPEDIC SURGERY | Facility: CLINIC | Age: 71
End: 2021-11-01
Payer: MEDICARE

## 2021-11-01 VITALS
BODY MASS INDEX: 32.44 KG/M2 | WEIGHT: 190 LBS | SYSTOLIC BLOOD PRESSURE: 157 MMHG | HEART RATE: 78 BPM | HEIGHT: 64 IN | DIASTOLIC BLOOD PRESSURE: 80 MMHG

## 2021-11-01 PROCEDURE — 99024 POSTOP FOLLOW-UP VISIT: CPT

## 2021-11-09 NOTE — HISTORY OF PRESENT ILLNESS
Message  Records from [facility] show [address] 6/2016,2015,2014 with paps  PMDD  MDL 2014 neg      Plan  Encounter for gynecological examination with abnormal finding    · Aviane 0 1-20 MG-MCG Oral Tablet; TAKE 1 TABLET DAILY AS DIRECTED   · Fluconazole 150 MG Oral Tablet (Diflucan); TAKE 1 TABLET 1 TIME ONLY   · Call [phone] if: You find a new or different kind of lump in your breast ;  Status:Complete;   Done: [id number]   · Decreasing the stress in your life may help your condition improve ; Status:Complete;    Done: 95VDV8982   · Eat foods that are high in calcium ; Status:Complete;   Done: 10TLH5430   · Regular aerobic exercise can help reduce stress ; Status:Complete;   Done: [id number]   · There are many ways to reduce your risk of catching or spreading a sexually transmitted  Infection ; Status:Complete;   Done: [id number]   · Vitamins can help you get daily requirements that your diet may not be giving you ;  Status:Complete;   Done: 56DQI1055   · We encourage all of our patients to exercise regularly  30 minutes of exercise or physical  activity five or more days a week is recommended for children and adults ;  Status:Complete;   Done: [id number]   · We recommend regular contraceptive use to prevent an unplanned pregnancy ;  Status:Complete;   Done: 52HIN4493   · Follow-up visit in 1 year Evaluation and Treatment  Follow-up  Status: Complete  Done:  [id number]  Pelvic pain in female    · * US PELVIS COMPLETE (TRANSABDOMINAL AND TRANSVAGINAL); Status:Active; Requested for:[id number];   PMH: Encounter for gynecological examination without abnormal finding    · (1) THIN PREP PAP WITH IMAGING; Status: In Progress - Specimen/Data Collected;    Done: 14GTL5160  Maturation index required? : No  : 12/15/17  HPV? : if ASCUS  PMH: Screening for STDs (sexually transmitted diseases)    · (1) CHLAMYDIA/GC AMPLIFIED DNA, PCR; Source:Cervix; Status: In Progress -  Specimen/Data Collected;   Done: 68OSH6159 [8] : the patient reports pain that is 8/10 in severity [Clean/Dry/Intact] : clean, dry and intact [Healed] : healed [Neuro Intact] : an unremarkable neurological exam [Vascular Intact] : ~T peripheral vascular exam normal [Doing Well] : is doing well [Excellent Pain Control] : has excellent pain control [No Sign of Infection] : is showing no signs of infection [Sutures Removed] : sutures were removed [Steri-Strips Removed & Replaced] : steri-strips removed and replaced [Chills] : no chills [Fever] : no fever [Nausea] : no nausea [Vomiting] : no vomiting [Erythema] : not erythematous [Discharge] : absent of discharge [Swelling] : not swollen [Dehiscence] : not dehisced [de-identified] : 70 y/o female s/p right shoulder SAD, RTCR and biceps tenotomy . She is doing well. Presents in post op brace.  She is taking Percocet. Denies post op complication.  [de-identified] : 70 y/o female s/p right shoulder SAD, RTCR and biceps tenotomy 10.20.2021 [de-identified] : Right shoulder exam:\par \par Inspection: No significant ecchymosis, no residual swelling\par Skin: Incisions C/D/I, no drainage, healed\par ROM: not tested \par Painful arc ROM: not tested\par Tenderness: Tenderness throughout the shoulder girdle\par Strength: Unable to test\par Vasc: 2+ radial pulse \par Neuro: AIN, PIN, Ulnar nerve intact to motor\par Sensation: Intact to light touch throughout  [de-identified] : 70 y/o female s/p right shoulder SAD, RTCR and biceps tenotomy \par \par All intraoperative imaging was discussed in detail with the patient. All questions were answered regardingsurgical procedure as well as immediate post-operative recovery period. We also discussed the post-operative rehabilitation program in great detail.\par \par Recommendations:\par 1. PT evaluation and treatment as per protocol provided (Rx given). HEP as instructed.\par 2. Brace: Discussed use of abduction brace, removal for hygeine and HEP.\par 3. Meds: Wean pain medication, may transition to Tylenol/NSAID's as tolerated.\par 4. Ice/cryocuff as needed\par 5. Restrictions: As discussed \par \par Followup 4 weeks for repeat clinical evaluation.

## 2021-11-09 NOTE — ADDENDUM
[FreeTextEntry1] : This note was written by Virgen Kerr on 11/01/2021 acting solely as a scribe for Dr. Yobani Galvan.\par \par All medical record entries made by the Scribe were at my, Dr. Yobani Galvan, direction and personally dictated by me on 11/01/2021. I have personally reviewed the chart and agree that the record accurately reflects my personal performance of the history, physical exam, assessment and plan.

## 2021-11-29 ENCOUNTER — APPOINTMENT (OUTPATIENT)
Dept: ORTHOPEDIC SURGERY | Facility: CLINIC | Age: 71
End: 2021-11-29
Payer: MEDICARE

## 2021-11-29 PROCEDURE — 99024 POSTOP FOLLOW-UP VISIT: CPT

## 2021-11-29 NOTE — HISTORY OF PRESENT ILLNESS
[8] : the patient reports pain that is 8/10 in severity [Clean/Dry/Intact] : clean, dry and intact [Healed] : healed [Neuro Intact] : an unremarkable neurological exam [Vascular Intact] : ~T peripheral vascular exam normal [Doing Well] : is doing well [No Sign of Infection] : is showing no signs of infection [Adequate Pain Control] : has adequate pain control [Chills] : no chills [Fever] : no fever [Nausea] : no nausea [Vomiting] : no vomiting [Erythema] : not erythematous [Discharge] : absent of discharge [Swelling] : not swollen [Dehiscence] : not dehisced [de-identified] : 72 y/o female s/p right shoulder SAD, RTCR and biceps tenotomy 10.20.2021 [de-identified] : 70 y/o female s/p right shoulder SAD, RTCR and biceps tenotomy. She is doing well. Presents in sling.  She is attending PT 2 x per week. She is taking Percocet to help with pain at night. Denies post op complication.  [de-identified] : Right shoulder exam:\par \par Inspection: No significant ecchymosis, no residual swelling\par Skin: Incisions C/D/I, no drainage, healed\par ROM: FF 60, ABD 60 and internal rotation to the hip\par Painful arc ROM: not tested\par Tenderness: Tenderness throughout the shoulder girdle\par Strength: Unable to test\par Vasc: 2+ radial pulse \par Neuro: AIN, PIN, Ulnar nerve intact to motor\par Sensation: Intact to light touch throughout  [de-identified] : 70 y/o female s/p right shoulder SAD, RTCR and biceps tenotomy \par \par Patient is doing well postoperatively, but continues to have stiffness to the upper extremity from surgery.  We recommended discontinuation of her brace at this time, and continued pain management modalities.\par \par Recommendations:\par 1. Continue PT and treatment.\par 2. Brace: Discontinue brace.\par 3. Meds: Wean pain medication, may transition to Tylenol/NSAID's as tolerated.\par 4. Ice/cryocuff as needed\par 5. Restrictions: As discussed \par \par Followup 4 weeks for repeat clinical evaluation.

## 2022-01-20 ENCOUNTER — APPOINTMENT (OUTPATIENT)
Dept: ORTHOPEDIC SURGERY | Facility: CLINIC | Age: 72
End: 2022-01-20
Payer: MEDICARE

## 2022-01-20 VITALS — HEIGHT: 64 IN | BODY MASS INDEX: 32.44 KG/M2 | WEIGHT: 190 LBS

## 2022-01-20 PROCEDURE — 99024 POSTOP FOLLOW-UP VISIT: CPT

## 2022-01-20 RX ORDER — CLOPIDOGREL BISULFATE 75 MG/1
75 TABLET, FILM COATED ORAL
Refills: 0 | Status: DISCONTINUED | COMMUNITY
End: 2022-01-20

## 2022-01-28 NOTE — ADDENDUM
[FreeTextEntry1] : This note was written by Virgen Kerr on 01/20/2022 acting solely as a scribe for Dr. Yobani Galvan.\par \par All medical record entries made by the Scribe were at my, Dr. Yobani Galvan, direction and personally dictated by me on 01/20/2022. I have personally reviewed the chart and agree that the record accurately reflects my personal performance of the history, physical exam, assessment and plan.

## 2022-01-28 NOTE — HISTORY OF PRESENT ILLNESS
[8] : the patient reports pain that is 8/10 in severity [Clean/Dry/Intact] : clean, dry and intact [Healed] : healed [Neuro Intact] : an unremarkable neurological exam [Vascular Intact] : ~T peripheral vascular exam normal [No Sign of Infection] : is showing no signs of infection [Adequate Pain Control] : has adequate pain control [Slow Progress] : is progressing slowly [Chills] : no chills [Fever] : no fever [Nausea] : no nausea [Vomiting] : no vomiting [Erythema] : not erythematous [Discharge] : absent of discharge [Swelling] : not swollen [Dehiscence] : not dehisced [de-identified] : 72 y/o female s/p right shoulder SAD, RTCR and biceps tenotomy 10.20.2021 [de-identified] : 70 y/o female s/p right shoulder SAD, RTCR and biceps tenotomy. She is doing well. She is attending PT 2 x per week progressing slowly. She is taking Tylenol w/o relief.  Denies post op complication.  [de-identified] : Right shoulder exam:\par \par Inspection: No residual swelling\par Skin: Incisions C/D/I, no drainage, healed\par ROM: FF 60, ABD 60 and internal rotation to the hip\par Painful arc ROM: Pain with further motion\par Tenderness: Tenderness throughout the shoulder girdle\par Strength: limited due to pain\par Vasc: 2+ radial pulse \par Neuro: AIN, PIN, Ulnar nerve intact to motor\par Sensation: Intact to light touch throughout  [de-identified] : 72 y/o female s/p right shoulder SAD, RTCR and biceps tenotomy \par \par Patient is doing well postoperatively, but continues to have stiffness to the upper extremity from surgery consistent with some postoperative adhesive capsulitis.  We recommended continued pain management modalities and progression of physical therapy.\par \par Recommendations:\par 1. Continue PT treatment as per protocol (Updated Rx given). HEP for terminal ROM exercises / strengthening and conditioning.\par 2. Meds: Trial NSAID as tolerated.\par 3. Ice PRN\par 4. Return to ADL's, light activity as instructed.\par \par Followup 6weeks.

## 2022-03-03 ENCOUNTER — APPOINTMENT (OUTPATIENT)
Dept: ORTHOPEDIC SURGERY | Facility: CLINIC | Age: 72
End: 2022-03-03
Payer: MEDICARE

## 2022-03-03 PROCEDURE — 99213 OFFICE O/P EST LOW 20 MIN: CPT

## 2022-03-03 NOTE — DISCUSSION/SUMMARY
[de-identified] : 72 y/o female s/p right shoulder SAD, RTCR and biceps tenotomy \par \par Patient is doing well postoperatively, but continues to have painful stiffness to the upper extremity from surgery consistent with some postoperative adhesive capsulitis. We recommended continued pain management modalities and progression of physical therapy.  We discussed continued utility of anti-inflammatory medications as tolerated.  Patient did not tolerate Celebrex, and has tried meloxicam previously with some improvement of symptoms as well as decreased risk of GI side effects.\par \par Recommendations:\par 1. Continue PT treatment as per protocol (Updated Rx given). HEP for terminal ROM exercises / strengthening and conditioning.\par 2. Meds: Trial NSAID as tolerated. Meloxicam Rx given. \par 3. Ice PRN\par 4. Return to ADL's, light activity as instructed.\par \par Follow-up 2 months.

## 2022-03-03 NOTE — HISTORY OF PRESENT ILLNESS
[de-identified] : 70 y/o female s/p right shoulder SAD, RTCR and biceps tenotomy 10.20.21. She is doing well. Attending PT 2 x per week progressing slowly. She is taking Tylenol/ w/o relief.  Trialed Celebrex, but reported that it gave her stomach issues.  Has difficulty with anti-inflammatory medications.  Denies post op complication.  Pain continues to be relatively severe within the right shoulder and unrelenting.

## 2022-03-03 NOTE — ADDENDUM
[FreeTextEntry1] : This note was written by Virgen Kerr on 03/03/2022 acting solely as a scribe for Dr. Yobani Galvan.\par \par All medical record entries made by the Scribe were at my, Dr. Yobani Galvan, direction and personally dictated by me on 03/03/2022. I have personally reviewed the chart and agree that the record accurately reflects my personal performance of the history, physical exam, assessment and plan.

## 2022-03-03 NOTE — PHYSICAL EXAM
[de-identified] : Oriented to time, place, person\par Mood: Normal\par Affect: Normal\par Appearance: Healthy, well appearing, no acute distress.\par Gait: Normal\par Assistive Devices: None \par \par Right shoulder exam:\par \par Inspection: No residual swelling\par Skin: Incisions C/D/I, no drainage, healed\par ROM: FF 80, ABD 70, ER 35, and internal rotation to mid lumbar\par PROM:130 FF, 70 abd\par Painful arc ROM: Pain with further motion\par Tenderness: Tenderness throughout the shoulder girdle\par Strength: 4.5 ER, 4+/5 IR\par Vasc: 2+ radial pulse \par Neuro: AIN, PIN, Ulnar nerve intact to motor\par Sensation: Intact to light touch throughout.

## 2022-04-28 ENCOUNTER — APPOINTMENT (OUTPATIENT)
Dept: VASCULAR SURGERY | Facility: CLINIC | Age: 72
End: 2022-04-28
Payer: MEDICARE

## 2022-04-28 VITALS — SYSTOLIC BLOOD PRESSURE: 120 MMHG | OXYGEN SATURATION: 98 % | DIASTOLIC BLOOD PRESSURE: 76 MMHG | HEART RATE: 62 BPM

## 2022-04-28 PROCEDURE — 99213 OFFICE O/P EST LOW 20 MIN: CPT

## 2022-05-03 ENCOUNTER — APPOINTMENT (OUTPATIENT)
Dept: ORTHOPEDIC SURGERY | Facility: CLINIC | Age: 72
End: 2022-05-03
Payer: MEDICARE

## 2022-05-03 PROCEDURE — 99213 OFFICE O/P EST LOW 20 MIN: CPT

## 2022-05-04 NOTE — HISTORY OF PRESENT ILLNESS
[de-identified] : 72 y/o female s/p right shoulder SAD, RTCR and biceps tenotomy 10.20.21. She is doing well. Attending PT 2 x per week struggling with overhead ROM.  She is taking Tylenol w/o relief. Has difficulty with anti-inflammatory medications.  Denies post op complication.

## 2022-05-04 NOTE — ADDENDUM
[FreeTextEntry1] : This note was written by Virgen Kerr on 05/03/2022 acting solely as a scribe for Dr. Yobani Galvan.\par \par All medical record entries made by the Scribe were at my, Dr. Yobani Galvan, direction and personally dictated by me on 05/03/2022. I have personally reviewed the chart and agree that the record accurately reflects my personal performance of the history, physical exam, assessment and plan.

## 2022-05-04 NOTE — DISCUSSION/SUMMARY
[de-identified] : 72 y/o female s/p right shoulder SAD, RTCR and biceps tenotomy \par \par Patient is doing well postoperatively. Clinically, patient's ROM and pain has improved since the last visit. She however continues to have stiffness to the upper extremity from surgery consistent with some postoperative adhesive capsulitis. We recommended continued pain management modalities and progression of physical therapy. \par \par Recommendations:\par 1. Continue PT treatment as per protocol (Updated Rx given). HEP for terminal ROM exercises / strengthening and conditioning.\par 2. Meds: NSAIDs/Acetaminophen prn. \par 3. Ice PRN\par 4. Return to ADL's, light activity as instructed.\par \par Follow-up 3 months.

## 2022-05-04 NOTE — PHYSICAL EXAM
[de-identified] : Oriented to time, place, person\par Mood: Normal\par Affect: Normal\par Appearance: Healthy, well appearing, no acute distress.\par Gait: Normal\par Assistive Devices: None \par \par Right shoulder exam:\par \par Inspection: No residual swelling\par Skin: Incisions C/D/I, no drainage, healed\par ROM: , ABD 70, ER 45, and internal rotation to mid lumbar\par PROM:130 FF, 70 abd\par Painful arc ROM: Pain with further motion\par Tenderness: minimal tenderness throughout the shoulder girdle\par Strength: 4 ER, 5/5 IR, 4-/5 FF\par Vasc: 2+ radial pulse \par Neuro: AIN, PIN, Ulnar nerve intact to motor\par Sensation: Intact to light touch throughout.

## 2022-06-04 NOTE — H&P PST ADULT - NEGATIVE NEUROLOGICAL SYMPTOMS
Benzoyl Peroxide Pregnancy And Lactation Text: This medication is Pregnancy Category C. It is unknown if benzoyl peroxide is excreted in breast milk. Dapsone Counseling: I discussed with the patient the risks of dapsone including but not limited to hemolytic anemia, agranulocytosis, rashes, methemoglobinemia, kidney failure, peripheral neuropathy, headaches, GI upset, and liver toxicity.  Patients who start dapsone require monitoring including baseline LFTs and weekly CBCs for the first month, then every month thereafter.  The patient verbalized understanding of the proper use and possible adverse effects of dapsone.  All of the patient's questions and concerns were addressed. Azithromycin Pregnancy And Lactation Text: This medication is considered safe during pregnancy and is also secreted in breast milk. High Dose Vitamin A Pregnancy And Lactation Text: High dose vitamin A therapy is contraindicated during pregnancy and breast feeding. Bactrim Counseling:  I discussed with the patient the risks of sulfa antibiotics including but not limited to GI upset, allergic reaction, drug rash, diarrhea, dizziness, photosensitivity, and yeast infections.  Rarely, more serious reactions can occur including but not limited to aplastic anemia, agranulocytosis, methemoglobinemia, blood dyscrasias, liver or kidney failure, lung infiltrates or desquamative/blistering drug rashes. Tazorac Pregnancy And Lactation Text: This medication is not safe during pregnancy. It is unknown if this medication is excreted in breast milk. Erythromycin Pregnancy And Lactation Text: This medication is Pregnancy Category B and is considered safe during pregnancy. It is also excreted in breast milk. Winlevi Counseling:  I discussed with the patient the risks of topical clascoterone including but not limited to erythema, scaling, itching, and stinging. Patient voiced their understanding. Aklief counseling:  Patient advised to apply a pea-sized amount only at bedtime and wait 30 minutes after washing their face before applying.  If too drying, patient may add a non-comedogenic moisturizer.  The most commonly reported side effects including irritation, redness, scaling, dryness, stinging, burning, itching, and increased risk of sunburn.  The patient verbalized understanding of the proper use and possible adverse effects of retinoids.  All of the patient's questions and concerns were addressed. Spironolactone Counseling: Patient advised regarding risks of diarrhea, abdominal pain, hyperkalemia, birth defects (for female patients), liver toxicity and renal toxicity. The patient may need blood work to monitor liver and kidney function and potassium levels while on therapy. The patient verbalized understanding of the proper use and possible adverse effects of spironolactone.  All of the patient's questions and concerns were addressed. Dapsone Pregnancy And Lactation Text: This medication is Pregnancy Category C and is not considered safe during pregnancy or breast feeding. Spironolactone Pregnancy And Lactation Text: This medication can cause feminization of the male fetus and should be avoided during pregnancy. The active metabolite is also found in breast milk. Minocycline Counseling: Patient advised regarding possible photosensitivity and discoloration of the teeth, skin, lips, tongue and gums.  Patient instructed to avoid sunlight, if possible.  When exposed to sunlight, patients should wear protective clothing, sunglasses, and sunscreen.  The patient was instructed to call the office immediately if the following severe adverse effects occur:  hearing changes, easy bruising/bleeding, severe headache, or vision changes.  The patient verbalized understanding of the proper use and possible adverse effects of minocycline.  All of the patient's questions and concerns were addressed. Topical Clindamycin Pregnancy And Lactation Text: This medication is Pregnancy Category B and is considered safe during pregnancy. It is unknown if it is excreted in breast milk. Topical Clindamycin Counseling: Patient counseled that this medication may cause skin irritation or allergic reactions.  In the event of skin irritation, the patient was advised to reduce the amount of the drug applied or use it less frequently.   The patient verbalized understanding of the proper use and possible adverse effects of clindamycin.  All of the patient's questions and concerns were addressed. Winlevi Pregnancy And Lactation Text: This medication is considered safe during pregnancy and breastfeeding. Aklief Pregnancy And Lactation Text: It is unknown if this medication is safe to use during pregnancy.  It is unknown if this medication is excreted in breast milk.  Breastfeeding women should use the topical cream on the smallest area of the skin for the shortest time needed while breastfeeding.  Do not apply to nipple and areola. Bactrim Pregnancy And Lactation Text: This medication is Pregnancy Category D and is known to cause fetal risk.  It is also excreted in breast milk. Topical Retinoid counseling:  Patient advised to apply a pea-sized amount only at bedtime and wait 30 minutes after washing their face before applying.  If too drying, patient may add a non-comedogenic moisturizer. The patient verbalized understanding of the proper use and possible adverse effects of retinoids.  All of the patient's questions and concerns were addressed. Isotretinoin Counseling: Patient should get monthly blood tests, not donate blood, not drive at night if vision affected, not share medication, and not undergo elective surgery for 6 months after tx completed. Side effects reviewed, pt to contact office should one occur. Isotretinoin Pregnancy And Lactation Text: This medication is Pregnancy Category X and is considered extremely dangerous during pregnancy. It is unknown if it is excreted in breast milk. Topical Sulfur Applications Counseling: Topical Sulfur Counseling: Patient counseled that this medication may cause skin irritation or allergic reactions.  In the event of skin irritation, the patient was advised to reduce the amount of the drug applied or use it less frequently.   The patient verbalized understanding of the proper use and possible adverse effects of topical sulfur application.  All of the patient's questions and concerns were addressed. Doxycycline Counseling:  Patient counseled regarding possible photosensitivity and increased risk for sunburn.  Patient instructed to avoid sunlight, if possible.  When exposed to sunlight, patients should wear protective clothing, sunglasses, and sunscreen.  The patient was instructed to call the office immediately if the following severe adverse effects occur:  hearing changes, easy bruising/bleeding, severe headache, or vision changes.  The patient verbalized understanding of the proper use and possible adverse effects of doxycycline.  All of the patient's questions and concerns were addressed. Tetracycline Counseling: Patient counseled regarding possible photosensitivity and increased risk for sunburn.  Patient instructed to avoid sunlight, if possible.  When exposed to sunlight, patients should wear protective clothing, sunglasses, and sunscreen.  The patient was instructed to call the office immediately if the following severe adverse effects occur:  hearing changes, easy bruising/bleeding, severe headache, or vision changes.  The patient verbalized understanding of the proper use and possible adverse effects of tetracycline.  All of the patient's questions and concerns were addressed. Patient understands to avoid pregnancy while on therapy due to potential birth defects. Include Pregnancy/Lactation Warning?: No Azelaic Acid Pregnancy And Lactation Text: This medication is considered safe during pregnancy and breast feeding. Minocycline Pregnancy And Lactation Text: This medication is Pregnancy Category D and not consider safe during pregnancy. It is also excreted in breast milk. Azelaic Acid Counseling: Patient counseled that medicine may cause skin irritation and to avoid applying near the eyes.  In the event of skin irritation, the patient was advised to reduce the amount of the drug applied or use it less frequently.   The patient verbalized understanding of the proper use and possible adverse effects of azelaic acid.  All of the patient's questions and concerns were addressed. Sarecycline Counseling: Patient advised regarding possible photosensitivity and discoloration of the teeth, skin, lips, tongue and gums.  Patient instructed to avoid sunlight, if possible.  When exposed to sunlight, patients should wear protective clothing, sunglasses, and sunscreen.  The patient was instructed to call the office immediately if the following severe adverse effects occur:  hearing changes, easy bruising/bleeding, severe headache, or vision changes.  The patient verbalized understanding of the proper use and possible adverse effects of sarecycline.  All of the patient's questions and concerns were addressed. Topical Retinoid Pregnancy And Lactation Text: This medication is Pregnancy Category C. It is unknown if this medication is excreted in breast milk. Birth Control Pills Counseling: Birth Control Pill Counseling: I discussed with the patient the potential side effects of OCPs including but not limited to increased risk of stroke, heart attack, thrombophlebitis, deep venous thrombosis, hepatic adenomas, breast changes, GI upset, headaches, and depression.  The patient verbalized understanding of the proper use and possible adverse effects of OCPs. All of the patient's questions and concerns were addressed. Topical Sulfur Applications Pregnancy And Lactation Text: This medication is Pregnancy Category C and has an unknown safety profile during pregnancy. It is unknown if this topical medication is excreted in breast milk. Birth Control Pills Pregnancy And Lactation Text: This medication should be avoided if pregnant and for the first 30 days post-partum. Benzoyl Peroxide Counseling: Patient counseled that medicine may cause skin irritation and bleach clothing.  In the event of skin irritation, the patient was advised to reduce the amount of the drug applied or use it less frequently.   The patient verbalized understanding of the proper use and possible adverse effects of benzoyl peroxide.  All of the patient's questions and concerns were addressed. Azithromycin Counseling:  I discussed with the patient the risks of azithromycin including but not limited to GI upset, allergic reaction, drug rash, diarrhea, and yeast infections. High Dose Vitamin A Counseling: Side effects reviewed, pt to contact office should one occur. Detail Level: Detailed Doxycycline Pregnancy And Lactation Text: This medication is Pregnancy Category D and not consider safe during pregnancy. It is also excreted in breast milk but is considered safe for shorter treatment courses. Tazorac Counseling:  Patient advised that medication is irritating and drying.  Patient may need to apply sparingly and wash off after an hour before eventually leaving it on overnight.  The patient verbalized understanding of the proper use and possible adverse effects of tazorac.  All of the patient's questions and concerns were addressed. Erythromycin Counseling:  I discussed with the patient the risks of erythromycin including but not limited to GI upset, allergic reaction, drug rash, diarrhea, increase in liver enzymes, and yeast infections. no CVA/no generalized seizures/no focal seizures/no headache

## 2022-08-04 ENCOUNTER — APPOINTMENT (OUTPATIENT)
Dept: ORTHOPEDIC SURGERY | Facility: CLINIC | Age: 72
End: 2022-08-04
Payer: MEDICARE

## 2022-08-04 VITALS
SYSTOLIC BLOOD PRESSURE: 120 MMHG | HEART RATE: 62 BPM | HEIGHT: 64 IN | DIASTOLIC BLOOD PRESSURE: 76 MMHG | WEIGHT: 190 LBS | BODY MASS INDEX: 32.44 KG/M2

## 2022-08-04 PROCEDURE — 99213 OFFICE O/P EST LOW 20 MIN: CPT

## 2022-08-15 ENCOUNTER — APPOINTMENT (OUTPATIENT)
Dept: ORTHOPEDIC SURGERY | Facility: CLINIC | Age: 72
End: 2022-08-15
Payer: MEDICARE

## 2022-08-15 ENCOUNTER — NON-APPOINTMENT (OUTPATIENT)
Age: 72
End: 2022-08-15

## 2022-08-15 VITALS
BODY MASS INDEX: 32.76 KG/M2 | HEART RATE: 54 BPM | SYSTOLIC BLOOD PRESSURE: 150 MMHG | WEIGHT: 191.9 LBS | HEIGHT: 64 IN | DIASTOLIC BLOOD PRESSURE: 80 MMHG

## 2022-08-15 DIAGNOSIS — M51.34 OTHER INTERVERTEBRAL DISC DEGENERATION, THORACIC REGION: ICD-10-CM

## 2022-08-15 DIAGNOSIS — M51.36 OTHER INTERVERTEBRAL DISC DEGENERATION, LUMBAR REGION: ICD-10-CM

## 2022-08-15 PROCEDURE — 99214 OFFICE O/P EST MOD 30 MIN: CPT

## 2022-08-15 PROCEDURE — 99204 OFFICE O/P NEW MOD 45 MIN: CPT

## 2022-08-15 RX ORDER — DILTIAZEM HYDROCHLORIDE 240 MG/1
240 CAPSULE, EXTENDED RELEASE ORAL
Qty: 90 | Refills: 0 | Status: ACTIVE | COMMUNITY
Start: 2022-03-09

## 2022-08-15 RX ORDER — GABAPENTIN 300 MG/1
300 CAPSULE ORAL
Qty: 90 | Refills: 0 | Status: ACTIVE | COMMUNITY
Start: 2022-07-27

## 2022-08-15 NOTE — HISTORY OF PRESENT ILLNESS
[de-identified] : 72 y/o female s/p right shoulder SAD, RTCR and biceps tenotomy 10.20.21. She is doing well. Stopped PT last week due to scheduling issues. She reports improvement of 90% overall. She is able to do her ADLs with out issue. She is taking Tylenol w/o relief.  Denies post op complication.

## 2022-08-15 NOTE — DISCUSSION/SUMMARY
[de-identified] : 72 y/o female s/p right shoulder SAD, RTCR and biceps tenotomy \par \par Patient is doing well postoperatively. Clinically, patient's ROM and pain has improved since the last visit. She however continues to have mild stiffness to the upper extremity from surgery consistent with some postoperative adhesive capsulitis. We recommended continued aggressive ROM therapy HEP vs. PT. \par \par Recommendations:\par 1. Continue PT treatment as per protocol (Updated Rx given). HEP for terminal ROM exercises / strengthening and conditioning.\par 2. Meds: NSAIDs/Acetaminophen prn. \par 3. Ice PRN\par 4. Return to ADL's, light activity as instructed.\par \par Follow-up 3 months.

## 2022-08-15 NOTE — PHYSICAL EXAM
[de-identified] : Oriented to time, place, person\par Mood: Normal\par Affect: Normal\par Appearance: Healthy, well appearing, no acute distress.\par Gait: Normal\par Assistive Devices: None \par \par Right shoulder exam:\par \par Inspection: No residual swelling\par Skin: Incisions C/D/I, no drainage, healed\par ROM: , ABD 80, ER 60, and internal rotation to mid lumbar\par Painful arc ROM: Pain with further motion\par Tenderness: minimal tenderness throughout the shoulder girdle\par Strength: 5/5 ER, 5/5 IR, 4+/5 FF\par Vasc: 2+ radial pulse \par Neuro: AIN, PIN, Ulnar nerve intact to motor\par Sensation: Intact to light touch throughout.

## 2022-08-15 NOTE — ADDENDUM
[FreeTextEntry1] : This note was written by Virgen Kerr on 08/04/2022 acting solely as a scribe for Dr. Yobani Galvan.\par \par All medical record entries made by the Scribe were at my, Dr. Yobani Galvan, direction and personally dictated by me on 08/04/2022. I have personally reviewed the chart and agree that the record accurately reflects my personal performance of the history, physical exam, assessment and plan.

## 2022-10-20 ENCOUNTER — APPOINTMENT (OUTPATIENT)
Dept: VASCULAR SURGERY | Facility: CLINIC | Age: 72
End: 2022-10-20
Payer: MEDICARE

## 2022-10-20 VITALS — OXYGEN SATURATION: 96 % | SYSTOLIC BLOOD PRESSURE: 122 MMHG | DIASTOLIC BLOOD PRESSURE: 60 MMHG | HEART RATE: 72 BPM

## 2022-10-20 DIAGNOSIS — Z82.3 FAMILY HISTORY OF STROKE: ICD-10-CM

## 2022-10-20 PROCEDURE — 99213 OFFICE O/P EST LOW 20 MIN: CPT

## 2022-10-25 PROBLEM — Z82.3 FAMILY HISTORY OF CEREBROVASCULAR ACCIDENT (CVA): Status: ACTIVE | Noted: 2020-02-11

## 2022-11-03 ENCOUNTER — APPOINTMENT (OUTPATIENT)
Dept: ORTHOPEDIC SURGERY | Facility: CLINIC | Age: 72
End: 2022-11-03
Payer: MEDICARE

## 2022-11-03 VITALS
WEIGHT: 185 LBS | HEIGHT: 64 IN | SYSTOLIC BLOOD PRESSURE: 149 MMHG | HEART RATE: 76 BPM | DIASTOLIC BLOOD PRESSURE: 79 MMHG | BODY MASS INDEX: 31.58 KG/M2 | OXYGEN SATURATION: 9 %

## 2022-11-03 DIAGNOSIS — M75.121 COMPLETE ROTATOR CUFF TEAR OR RUPTURE OF RIGHT SHOULDER, NOT SPECIFIED AS TRAUMATIC: ICD-10-CM

## 2022-11-03 PROCEDURE — 99213 OFFICE O/P EST LOW 20 MIN: CPT

## 2022-11-03 NOTE — ADDENDUM
[FreeTextEntry1] : This note was written by Virgen Kerr on 11/03/2022 acting solely as a scribe for Dr. Yobani Galvan.\par \par All medical record entries made by the Scribe were at my, Dr. Yobani Galvan, direction and personally dictated by me on 11/03/2022. I have personally reviewed the chart and agree that the record accurately reflects my personal performance of the history, physical exam, assessment and plan.

## 2022-11-03 NOTE — HISTORY OF PRESENT ILLNESS
[de-identified] : 71 y/o female s/p right shoulder SAD, RTCR and biceps tenotomy 10.20.21. She is able to do her ADLs with out issue. Reports occasional pain in the shoulder otherwise doing well.  She is taking Tylenol w/o relief. Denies post op complication.

## 2022-11-03 NOTE — DISCUSSION/SUMMARY
[de-identified] : 71 y/o female s/p right shoulder SAD, RTCR and biceps tenotomy \par \par Patient is doing well postoperatively.  She is completed a rigorous physical therapy program, and has regained good strength and function to the upper extremity.  She has some mild complaints of lateral arm pain at times, and I discussed that this is consistent with high-grade rotator cuff injury.  \par \par Recommendations:\par 1. Continue HEP for terminal ROM exercises / strengthening and conditioning.\par 2. Meds: NSAIDs/Acetaminophen prn. \par 3. Ice PRN\par 4.  Activity to tolerance.\par \par Follow-up as needed.

## 2022-11-03 NOTE — PHYSICAL EXAM
[de-identified] : Oriented to time, place, person\par Mood: Normal\par Affect: Normal\par Appearance: Healthy, well appearing, no acute distress.\par Gait: Normal\par Assistive Devices: None \par \par Right shoulder exam:\par \par Inspection: No residual swelling\par Skin: Incisions C/D/I, no drainage, healed\par ROM: , ABD 70, ER 60, and internal rotation to mid lumbar\par Painful arc ROM: none\par Tenderness: none\par Strength: 5/5 ER, 5/5 IR, 5/5 FF\par Vasc: 2+ radial pulse \par Neuro: AIN, PIN, Ulnar nerve intact to motor\par Sensation: Intact to light touch throughout.

## 2023-12-28 RX ORDER — METHOCARBAMOL 500 MG/1
500 TABLET, FILM COATED ORAL
Qty: 30 | Refills: 1 | Status: DISCONTINUED | COMMUNITY
Start: 2021-06-29 | End: 2023-12-28

## 2023-12-28 RX ORDER — LIDOCAINE 5% 700 MG/1
5 PATCH TOPICAL
Qty: 30 | Refills: 0 | Status: DISCONTINUED | COMMUNITY
Start: 2022-07-27 | End: 2023-12-28

## 2023-12-28 RX ORDER — MOMETASONE FUROATE 1 MG/G
0.1 CREAM TOPICAL
Qty: 60 | Refills: 0 | Status: DISCONTINUED | COMMUNITY
Start: 2022-03-09 | End: 2023-12-28

## 2023-12-28 RX ORDER — HYDROCHLOROTHIAZIDE 12.5 MG/1
12.5 TABLET ORAL
Qty: 30 | Refills: 0 | Status: DISCONTINUED | COMMUNITY
Start: 2022-07-27 | End: 2023-12-28

## 2023-12-28 RX ORDER — SERTRALINE HYDROCHLORIDE 50 MG/1
50 TABLET, FILM COATED ORAL
Refills: 0 | Status: ACTIVE | COMMUNITY

## 2023-12-28 RX ORDER — LEVOTHYROXINE SODIUM 0.1 MG/1
100 TABLET ORAL
Qty: 90 | Refills: 0 | Status: DISCONTINUED | COMMUNITY
Start: 2022-03-09 | End: 2023-12-28

## 2023-12-28 RX ORDER — ROSUVASTATIN CALCIUM 20 MG/1
20 TABLET, FILM COATED ORAL
Qty: 90 | Refills: 0 | Status: DISCONTINUED | COMMUNITY
Start: 2022-03-09 | End: 2023-12-28

## 2023-12-28 RX ORDER — OXYCODONE AND ACETAMINOPHEN 5; 325 MG/1; MG/1
5-325 TABLET ORAL
Qty: 20 | Refills: 0 | Status: DISCONTINUED | COMMUNITY
Start: 2021-10-19 | End: 2023-12-28

## 2023-12-28 RX ORDER — CELECOXIB 200 MG/1
200 CAPSULE ORAL DAILY
Qty: 30 | Refills: 1 | Status: DISCONTINUED | COMMUNITY
Start: 2022-01-20 | End: 2023-12-28

## 2023-12-28 RX ORDER — GABAPENTIN 100 MG/1
100 CAPSULE ORAL
Qty: 60 | Refills: 0 | Status: DISCONTINUED | COMMUNITY
Start: 2018-03-05 | End: 2023-12-28

## 2023-12-28 RX ORDER — CIPROFLOXACIN HYDROCHLORIDE 500 MG/1
500 TABLET, FILM COATED ORAL
Qty: 14 | Refills: 0 | Status: DISCONTINUED | COMMUNITY
Start: 2022-06-20 | End: 2023-12-28

## 2023-12-28 RX ORDER — MELOXICAM 15 MG/1
15 TABLET ORAL DAILY
Qty: 45 | Refills: 1 | Status: DISCONTINUED | COMMUNITY
Start: 2022-03-03 | End: 2023-12-28

## 2023-12-28 RX ORDER — NITROFURANTOIN (MONOHYDRATE/MACROCRYSTALS) 25; 75 MG/1; MG/1
100 CAPSULE ORAL
Qty: 14 | Refills: 0 | Status: DISCONTINUED | COMMUNITY
Start: 2022-07-08 | End: 2023-12-28

## 2023-12-28 RX ORDER — LOSARTAN POTASSIUM 25 MG/1
25 TABLET, FILM COATED ORAL
Qty: 90 | Refills: 0 | Status: DISCONTINUED | COMMUNITY
Start: 2022-03-09 | End: 2023-12-28

## 2023-12-28 RX ORDER — CHOLECALCIFEROL (VITAMIN D3) 25 MCG
TABLET ORAL
Refills: 0 | Status: DISCONTINUED | COMMUNITY
End: 2023-12-28

## 2023-12-28 RX ORDER — DILTIAZEM HYDROCHLORIDE 120 MG/1
120 CAPSULE, EXTENDED RELEASE ORAL
Qty: 90 | Refills: 0 | Status: DISCONTINUED | COMMUNITY
Start: 2017-12-04 | End: 2023-12-28

## 2023-12-28 RX ORDER — OXYCODONE AND ACETAMINOPHEN 5; 325 MG/1; MG/1
5-325 TABLET ORAL
Qty: 10 | Refills: 0 | Status: DISCONTINUED | COMMUNITY
Start: 2021-11-29 | End: 2023-12-28

## 2023-12-28 RX ORDER — FENOFIBRIC ACID 135 MG/1
135 CAPSULE, DELAYED RELEASE ORAL
Refills: 0 | Status: ACTIVE | COMMUNITY

## 2024-01-03 ENCOUNTER — APPOINTMENT (OUTPATIENT)
Dept: VASCULAR SURGERY | Facility: CLINIC | Age: 74
End: 2024-01-03
Payer: MEDICARE

## 2024-01-03 VITALS
DIASTOLIC BLOOD PRESSURE: 77 MMHG | WEIGHT: 185 LBS | HEART RATE: 72 BPM | HEIGHT: 64 IN | BODY MASS INDEX: 31.58 KG/M2 | SYSTOLIC BLOOD PRESSURE: 130 MMHG

## 2024-01-03 PROCEDURE — 93880 EXTRACRANIAL BILAT STUDY: CPT

## 2024-01-03 PROCEDURE — 99213 OFFICE O/P EST LOW 20 MIN: CPT

## 2024-01-04 NOTE — PROCEDURE
[FreeTextEntry1] : Carotid duplex ordered to eval carotid stenosis shows: R ICA >70% stenosis (278/86 cm/s).  L ICA <50% stenosis.

## 2024-01-04 NOTE — PHYSICAL EXAM
[Respiratory Effort] : normal respiratory effort [No Rash or Lesion] : No rash or lesion [Alert] : alert [Oriented to Person] : oriented to person [Oriented to Place] : oriented to place [Oriented to Time] : oriented to time [Calm] : calm [2+] : left 2+ [Right Carotid Bruit] : no bruit heard over the right carotid [Left Carotid Bruit] : no bruit heard over the left carotid [Ankle Swelling (On Exam)] : not present [Varicose Veins Of Lower Extremities] : not present [] : not present [de-identified] : WN/WD [FreeTextEntry1] : No focal neurological deficits. [de-identified] : FROM

## 2024-01-04 NOTE — ADDENDUM
[FreeTextEntry1] : I, Dr. Moses Elias, personally performed the evaluation and management (E/M) services for this established patient who presents today with (a) new problem(s)/exacerbation of (an) existing condition(s).  That E/M includes conducting the examination, assessing all new/exacerbated conditions, and establishing a new plan of care.  Today, my ACP, Tabatha Alvarez NP, was here to observe my evaluation and management services for this new problem/exacerbated condition to be followed going forward.  The documentation for this encounter was entered by Hoda Steve acting as a scribe for Dr. Moses Elias.

## 2024-01-04 NOTE — HISTORY OF PRESENT ILLNESS
[FreeTextEntry1] : 72 y/o F with PMHx of HTN, HLD, CAD s/p stents x2, former smoker, and PSH of cervical spine, knee and ovarian surgeries, as well as known carotid stenosis, asymptomatic. She was recently seen at the  office, Dr KELLEY Matthews's office, for follow-up of the carotid arteries. An ultrasound was done and demonstrated worsening R sided stenosis. CTA was ordered and she brings the CD for us to review the images. Today she reports feeling well and denies any complaints. She also completed cardiac evaluation and she was informed it was benign. Denies any neurological symptoms. She has a strong family hx of stroke and is concerned about it. She is on statin and recently held ASA as she was instructed not to take it by Dr Matthews.   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

## 2024-01-04 NOTE — ASSESSMENT
[Arterial/Venous Disease] : arterial/venous disease [Medication Management] : medication management [FreeTextEntry1] : 74 y/o F w/ asymptomatic carotid stenosis R>L.  On exam, no focal neurological deficits. No carotid bruits. /77, HR 72. Radial pulses bilaterally. Carotid duplex showed  R ICA >70% stenosis (278/86 cm/s).  L ICA <50% stenosis.  Findings discussed with pt and recommend to proceed with R CEA w/ cervical block. Risk, complications and alternatives were discussed, all questions were answered. Patient would like to proceed with surgery.  She was instructed to restart ASA today, including the morning of the surgery. Continue to stay active and take statin.  Plan: -R CEA 1/8/23 -Pt holding Trulicity (last dose 1/29/23) -Pt to continue asa -Pt to hold Metformin the morning of surgery -Cardiac clearance on file

## 2024-01-05 VITALS
HEIGHT: 64 IN | TEMPERATURE: 97 F | RESPIRATION RATE: 16 BRPM | OXYGEN SATURATION: 98 % | SYSTOLIC BLOOD PRESSURE: 156 MMHG | HEART RATE: 57 BPM | DIASTOLIC BLOOD PRESSURE: 76 MMHG | WEIGHT: 172.84 LBS

## 2024-01-05 RX ORDER — FLUTICASONE FUROATE AND VILANTEROL TRIFENATATE 100; 25 UG/1; UG/1
0 POWDER RESPIRATORY (INHALATION)
Qty: 0 | Refills: 1 | DISCHARGE

## 2024-01-05 RX ORDER — FOLIC ACID 0.8 MG
1 TABLET ORAL
Qty: 0 | Refills: 0 | DISCHARGE

## 2024-01-05 RX ORDER — DULAGLUTIDE 4.5 MG/.5ML
1.5 INJECTION, SOLUTION SUBCUTANEOUS
Qty: 0 | Refills: 0 | DISCHARGE

## 2024-01-05 RX ORDER — METFORMIN HYDROCHLORIDE 850 MG/1
1 TABLET ORAL
Qty: 0 | Refills: 0 | DISCHARGE

## 2024-01-05 RX ORDER — SERTRALINE 25 MG/1
1 TABLET, FILM COATED ORAL
Qty: 0 | Refills: 0 | DISCHARGE

## 2024-01-05 RX ORDER — CHOLECALCIFEROL (VITAMIN D3) 125 MCG
1 CAPSULE ORAL
Qty: 0 | Refills: 0 | DISCHARGE

## 2024-01-05 RX ORDER — NEBIVOLOL HYDROCHLORIDE 5 MG/1
1 TABLET ORAL
Qty: 0 | Refills: 0 | DISCHARGE

## 2024-01-05 RX ORDER — CALCIUM CARBONATE 500(1250)
1 TABLET ORAL
Qty: 0 | Refills: 0 | DISCHARGE

## 2024-01-05 RX ORDER — OMEPRAZOLE 10 MG/1
1 CAPSULE, DELAYED RELEASE ORAL
Qty: 0 | Refills: 0 | DISCHARGE

## 2024-01-05 RX ORDER — ATORVASTATIN CALCIUM 80 MG/1
1 TABLET, FILM COATED ORAL
Qty: 0 | Refills: 0 | DISCHARGE

## 2024-01-05 RX ORDER — DILTIAZEM HCL 120 MG
1 CAPSULE, EXT RELEASE 24 HR ORAL
Qty: 0 | Refills: 0 | DISCHARGE

## 2024-01-05 RX ORDER — LOSARTAN POTASSIUM 100 MG/1
1 TABLET, FILM COATED ORAL
Qty: 0 | Refills: 0 | DISCHARGE

## 2024-01-05 RX ORDER — ASPIRIN/CALCIUM CARB/MAGNESIUM 324 MG
1 TABLET ORAL
Qty: 0 | Refills: 0 | DISCHARGE

## 2024-01-05 RX ORDER — LEVOTHYROXINE SODIUM 125 MCG
1 TABLET ORAL
Qty: 0 | Refills: 0 | DISCHARGE

## 2024-01-05 NOTE — PATIENT PROFILE ADULT - ARRIVAL FROM
[FreeTextEntry1] : 76 year old female with worsening low back pain refractory to physical therapy and pain management.  Normal neurological exam. MRI of lumbar spine ordered to assess for disc herniation or stenosis. Lumbar flexion-extension x-rays ordered to assess for instability of spine. Standing scoliosis ordered to assess for dynamic instability. She will return after imaging.  Home

## 2024-01-05 NOTE — PATIENT PROFILE ADULT - FALL HARM RISK - UNIVERSAL INTERVENTIONS
Bed in lowest position, wheels locked, appropriate side rails in place/Call bell, personal items and telephone in reach/Instruct patient to call for assistance before getting out of bed or chair/Non-slip footwear when patient is out of bed/[place] to call system/Physically safe environment - no spills, clutter or unnecessary equipment/Purposeful Proactive Rounding/Room/bathroom lighting operational, light cord in reach Bed in lowest position, wheels locked, appropriate side rails in place/Call bell, personal items and telephone in reach/Instruct patient to call for assistance before getting out of bed or chair/Non-slip footwear when patient is out of bed/[place] to call system/Physically safe environment - no spills, clutter or unnecessary equipment/Purposeful Proactive Rounding/Room/bathroom lighting operational, light cord in reach

## 2024-01-05 NOTE — PATIENT PROFILE ADULT - DO YOU LACK THE NECESSARY SUPPORT TO HELP YOU COPE WITH LIFE CHALLENGES?
"Chief Complaint   Patient presents with     RECHECK     RA     Vital signs:  Temp: 97.9  F (36.6  C) Temp src: Oral BP: 127/79 Pulse: 78   Resp: 18 SpO2: 98 %     Height: 163.8 cm (5' 4.49\") Weight: 76.3 kg (168 lb 4.8 oz)  Estimated body mass index is 28.45 kg/m  as calculated from the following:    Height as of this encounter: 1.638 m (5' 4.49\").    Weight as of this encounter: 76.3 kg (168 lb 4.8 oz).        Linda Giraldo, [facility]  11/29/2021 8:59 AM      " no

## 2024-01-05 NOTE — PRE-OP CHECKLIST - NS PREOP CHK MONITOR ANESTHESIA CONSENT
Exclude Pending Lab, Cardiology, and Radiology orders from printing on the Patient's Discharge Instructions, due to Privacy Concerns.
done

## 2024-01-07 ENCOUNTER — TRANSCRIPTION ENCOUNTER (OUTPATIENT)
Age: 74
End: 2024-01-07

## 2024-01-08 ENCOUNTER — INPATIENT (INPATIENT)
Facility: HOSPITAL | Age: 74
LOS: 0 days | Discharge: HOME | DRG: 39 | End: 2024-01-09
Attending: SURGERY | Admitting: SURGERY
Payer: MEDICARE

## 2024-01-08 ENCOUNTER — APPOINTMENT (OUTPATIENT)
Dept: VASCULAR SURGERY | Facility: HOSPITAL | Age: 74
End: 2024-01-08

## 2024-01-08 ENCOUNTER — RESULT REVIEW (OUTPATIENT)
Age: 74
End: 2024-01-08

## 2024-01-08 ENCOUNTER — TRANSCRIPTION ENCOUNTER (OUTPATIENT)
Age: 74
End: 2024-01-08

## 2024-01-08 DIAGNOSIS — Z98.890 OTHER SPECIFIED POSTPROCEDURAL STATES: Chronic | ICD-10-CM

## 2024-01-08 DIAGNOSIS — M12.9 ARTHROPATHY, UNSPECIFIED: Chronic | ICD-10-CM

## 2024-01-08 DIAGNOSIS — I25.10 ATHEROSCLEROTIC HEART DISEASE OF NATIVE CORONARY ARTERY WITHOUT ANGINA PECTORIS: Chronic | ICD-10-CM

## 2024-01-08 DIAGNOSIS — Z64.1 PROBLEMS RELATED TO MULTIPARITY: Chronic | ICD-10-CM

## 2024-01-08 LAB
ANION GAP SERPL CALC-SCNC: 6 MMOL/L — SIGNIFICANT CHANGE UP (ref 5–17)
ANION GAP SERPL CALC-SCNC: 6 MMOL/L — SIGNIFICANT CHANGE UP (ref 5–17)
APTT BLD: 26.4 SEC — SIGNIFICANT CHANGE UP (ref 24.5–35.6)
APTT BLD: 26.4 SEC — SIGNIFICANT CHANGE UP (ref 24.5–35.6)
BUN SERPL-MCNC: 15 MG/DL — SIGNIFICANT CHANGE UP (ref 7–23)
BUN SERPL-MCNC: 15 MG/DL — SIGNIFICANT CHANGE UP (ref 7–23)
CALCIUM SERPL-MCNC: 8.6 MG/DL — SIGNIFICANT CHANGE UP (ref 8.4–10.5)
CALCIUM SERPL-MCNC: 8.6 MG/DL — SIGNIFICANT CHANGE UP (ref 8.4–10.5)
CHLORIDE SERPL-SCNC: 105 MMOL/L — SIGNIFICANT CHANGE UP (ref 96–108)
CHLORIDE SERPL-SCNC: 105 MMOL/L — SIGNIFICANT CHANGE UP (ref 96–108)
CO2 SERPL-SCNC: 26 MMOL/L — SIGNIFICANT CHANGE UP (ref 22–31)
CO2 SERPL-SCNC: 26 MMOL/L — SIGNIFICANT CHANGE UP (ref 22–31)
CREAT SERPL-MCNC: 0.75 MG/DL — SIGNIFICANT CHANGE UP (ref 0.5–1.3)
CREAT SERPL-MCNC: 0.75 MG/DL — SIGNIFICANT CHANGE UP (ref 0.5–1.3)
EGFR: 83 ML/MIN/1.73M2 — SIGNIFICANT CHANGE UP
EGFR: 83 ML/MIN/1.73M2 — SIGNIFICANT CHANGE UP
GLUCOSE BLDC GLUCOMTR-MCNC: 136 MG/DL — HIGH (ref 70–99)
GLUCOSE BLDC GLUCOMTR-MCNC: 136 MG/DL — HIGH (ref 70–99)
GLUCOSE BLDC GLUCOMTR-MCNC: 63 MG/DL — LOW (ref 70–99)
GLUCOSE BLDC GLUCOMTR-MCNC: 63 MG/DL — LOW (ref 70–99)
GLUCOSE BLDC GLUCOMTR-MCNC: 78 MG/DL — SIGNIFICANT CHANGE UP (ref 70–99)
GLUCOSE BLDC GLUCOMTR-MCNC: 78 MG/DL — SIGNIFICANT CHANGE UP (ref 70–99)
GLUCOSE BLDC GLUCOMTR-MCNC: 86 MG/DL — SIGNIFICANT CHANGE UP (ref 70–99)
GLUCOSE BLDC GLUCOMTR-MCNC: 86 MG/DL — SIGNIFICANT CHANGE UP (ref 70–99)
GLUCOSE SERPL-MCNC: 98 MG/DL — SIGNIFICANT CHANGE UP (ref 70–99)
GLUCOSE SERPL-MCNC: 98 MG/DL — SIGNIFICANT CHANGE UP (ref 70–99)
HCT VFR BLD CALC: 28.9 % — LOW (ref 34.5–45)
HCT VFR BLD CALC: 28.9 % — LOW (ref 34.5–45)
HGB BLD-MCNC: 9.5 G/DL — LOW (ref 11.5–15.5)
HGB BLD-MCNC: 9.5 G/DL — LOW (ref 11.5–15.5)
INR BLD: 1.12 — SIGNIFICANT CHANGE UP (ref 0.85–1.18)
INR BLD: 1.12 — SIGNIFICANT CHANGE UP (ref 0.85–1.18)
ISTAT ACTK (ACTIVATED CLOTTING TIME KAOLIN): 212 SEC — HIGH (ref 74–137)
ISTAT ACTK (ACTIVATED CLOTTING TIME KAOLIN): 212 SEC — HIGH (ref 74–137)
MAGNESIUM SERPL-MCNC: 1.8 MG/DL — SIGNIFICANT CHANGE UP (ref 1.6–2.6)
MAGNESIUM SERPL-MCNC: 1.8 MG/DL — SIGNIFICANT CHANGE UP (ref 1.6–2.6)
MCHC RBC-ENTMCNC: 27.3 PG — SIGNIFICANT CHANGE UP (ref 27–34)
MCHC RBC-ENTMCNC: 27.3 PG — SIGNIFICANT CHANGE UP (ref 27–34)
MCHC RBC-ENTMCNC: 32.9 GM/DL — SIGNIFICANT CHANGE UP (ref 32–36)
MCHC RBC-ENTMCNC: 32.9 GM/DL — SIGNIFICANT CHANGE UP (ref 32–36)
MCV RBC AUTO: 83 FL — SIGNIFICANT CHANGE UP (ref 80–100)
MCV RBC AUTO: 83 FL — SIGNIFICANT CHANGE UP (ref 80–100)
NRBC # BLD: 0 /100 WBCS — SIGNIFICANT CHANGE UP (ref 0–0)
NRBC # BLD: 0 /100 WBCS — SIGNIFICANT CHANGE UP (ref 0–0)
PHOSPHATE SERPL-MCNC: 3.1 MG/DL — SIGNIFICANT CHANGE UP (ref 2.5–4.5)
PHOSPHATE SERPL-MCNC: 3.1 MG/DL — SIGNIFICANT CHANGE UP (ref 2.5–4.5)
PLATELET # BLD AUTO: 199 K/UL — SIGNIFICANT CHANGE UP (ref 150–400)
PLATELET # BLD AUTO: 199 K/UL — SIGNIFICANT CHANGE UP (ref 150–400)
POTASSIUM SERPL-MCNC: 4.2 MMOL/L — SIGNIFICANT CHANGE UP (ref 3.5–5.3)
POTASSIUM SERPL-MCNC: 4.2 MMOL/L — SIGNIFICANT CHANGE UP (ref 3.5–5.3)
POTASSIUM SERPL-SCNC: 4.2 MMOL/L — SIGNIFICANT CHANGE UP (ref 3.5–5.3)
POTASSIUM SERPL-SCNC: 4.2 MMOL/L — SIGNIFICANT CHANGE UP (ref 3.5–5.3)
PROTHROM AB SERPL-ACNC: 12.7 SEC — SIGNIFICANT CHANGE UP (ref 9.5–13)
PROTHROM AB SERPL-ACNC: 12.7 SEC — SIGNIFICANT CHANGE UP (ref 9.5–13)
RBC # BLD: 3.48 M/UL — LOW (ref 3.8–5.2)
RBC # BLD: 3.48 M/UL — LOW (ref 3.8–5.2)
RBC # FLD: 13.3 % — SIGNIFICANT CHANGE UP (ref 10.3–14.5)
RBC # FLD: 13.3 % — SIGNIFICANT CHANGE UP (ref 10.3–14.5)
SODIUM SERPL-SCNC: 137 MMOL/L — SIGNIFICANT CHANGE UP (ref 135–145)
SODIUM SERPL-SCNC: 137 MMOL/L — SIGNIFICANT CHANGE UP (ref 135–145)
WBC # BLD: 5.14 K/UL — SIGNIFICANT CHANGE UP (ref 3.8–10.5)
WBC # BLD: 5.14 K/UL — SIGNIFICANT CHANGE UP (ref 3.8–10.5)
WBC # FLD AUTO: 5.14 K/UL — SIGNIFICANT CHANGE UP (ref 3.8–10.5)
WBC # FLD AUTO: 5.14 K/UL — SIGNIFICANT CHANGE UP (ref 3.8–10.5)

## 2024-01-08 PROCEDURE — 99223 1ST HOSP IP/OBS HIGH 75: CPT

## 2024-01-08 PROCEDURE — 88304 TISSUE EXAM BY PATHOLOGIST: CPT | Mod: 26

## 2024-01-08 PROCEDURE — 88311 DECALCIFY TISSUE: CPT | Mod: 26

## 2024-01-08 PROCEDURE — 35301 RECHANNELING OF ARTERY: CPT | Mod: GC,RT

## 2024-01-08 DEVICE — LIGATING CLIPS WECK HORIZON SMALL-WIDE (RED) 24: Type: IMPLANTABLE DEVICE | Site: RIGHT | Status: FUNCTIONAL

## 2024-01-08 DEVICE — PATCH PERICARDIAL PHOTOFIX .8X8CM: Type: IMPLANTABLE DEVICE | Site: RIGHT | Status: FUNCTIONAL

## 2024-01-08 DEVICE — ARISTA 3GR: Type: IMPLANTABLE DEVICE | Site: RIGHT | Status: FUNCTIONAL

## 2024-01-08 DEVICE — SURGICEL FIBRILLAR 4 X 4": Type: IMPLANTABLE DEVICE | Site: RIGHT | Status: FUNCTIONAL

## 2024-01-08 DEVICE — IMPLANTABLE DEVICE: Type: IMPLANTABLE DEVICE | Site: RIGHT | Status: FUNCTIONAL

## 2024-01-08 DEVICE — LIGATING CLIPS WECK HORIZON SMALL (YELLOW) 24: Type: IMPLANTABLE DEVICE | Site: RIGHT | Status: FUNCTIONAL

## 2024-01-08 RX ORDER — DULAGLUTIDE 4.5 MG/.5ML
3 INJECTION, SOLUTION SUBCUTANEOUS
Refills: 0 | DISCHARGE

## 2024-01-08 RX ORDER — SERTRALINE 25 MG/1
50 TABLET, FILM COATED ORAL DAILY
Refills: 0 | Status: DISCONTINUED | OUTPATIENT
Start: 2024-01-09 | End: 2024-01-09

## 2024-01-08 RX ORDER — LOSARTAN POTASSIUM 100 MG/1
1 TABLET, FILM COATED ORAL
Refills: 0 | DISCHARGE

## 2024-01-08 RX ORDER — SODIUM CHLORIDE 9 MG/ML
1000 INJECTION INTRAMUSCULAR; INTRAVENOUS; SUBCUTANEOUS
Refills: 0 | Status: DISCONTINUED | OUTPATIENT
Start: 2024-01-08 | End: 2024-01-08

## 2024-01-08 RX ORDER — ACETAMINOPHEN 500 MG
1000 TABLET ORAL EVERY 6 HOURS
Refills: 0 | Status: DISCONTINUED | OUTPATIENT
Start: 2024-01-08 | End: 2024-01-09

## 2024-01-08 RX ORDER — OMEPRAZOLE 10 MG/1
1 CAPSULE, DELAYED RELEASE ORAL
Refills: 0 | DISCHARGE

## 2024-01-08 RX ORDER — MAGNESIUM SULFATE 500 MG/ML
1 VIAL (ML) INJECTION ONCE
Refills: 0 | Status: COMPLETED | OUTPATIENT
Start: 2024-01-08 | End: 2024-01-08

## 2024-01-08 RX ORDER — DEXTROSE 50 % IN WATER 50 %
25 SYRINGE (ML) INTRAVENOUS ONCE
Refills: 0 | Status: DISCONTINUED | OUTPATIENT
Start: 2024-01-08 | End: 2024-01-09

## 2024-01-08 RX ORDER — ATORVASTATIN CALCIUM 80 MG/1
1 TABLET, FILM COATED ORAL
Refills: 0 | DISCHARGE

## 2024-01-08 RX ORDER — GABAPENTIN 400 MG/1
300 CAPSULE ORAL AT BEDTIME
Refills: 0 | Status: DISCONTINUED | OUTPATIENT
Start: 2024-01-08 | End: 2024-01-09

## 2024-01-08 RX ORDER — FOLIC ACID 0.8 MG
1 TABLET ORAL DAILY
Refills: 0 | Status: DISCONTINUED | OUTPATIENT
Start: 2024-01-09 | End: 2024-01-09

## 2024-01-08 RX ORDER — SODIUM CHLORIDE 9 MG/ML
1000 INJECTION, SOLUTION INTRAVENOUS
Refills: 0 | Status: DISCONTINUED | OUTPATIENT
Start: 2024-01-08 | End: 2024-01-09

## 2024-01-08 RX ORDER — FOLIC ACID 0.8 MG
1 TABLET ORAL
Refills: 0 | DISCHARGE

## 2024-01-08 RX ORDER — LEVOTHYROXINE SODIUM 125 MCG
1 TABLET ORAL
Refills: 0 | DISCHARGE

## 2024-01-08 RX ORDER — DEXTROSE 50 % IN WATER 50 %
12.5 SYRINGE (ML) INTRAVENOUS ONCE
Refills: 0 | Status: DISCONTINUED | OUTPATIENT
Start: 2024-01-08 | End: 2024-01-09

## 2024-01-08 RX ORDER — ASPIRIN/CALCIUM CARB/MAGNESIUM 324 MG
1 TABLET ORAL
Refills: 0 | DISCHARGE

## 2024-01-08 RX ORDER — INSULIN LISPRO 100/ML
VIAL (ML) SUBCUTANEOUS
Refills: 0 | Status: DISCONTINUED | OUTPATIENT
Start: 2024-01-08 | End: 2024-01-09

## 2024-01-08 RX ORDER — HYDRALAZINE HCL 50 MG
10 TABLET ORAL ONCE
Refills: 0 | Status: COMPLETED | OUTPATIENT
Start: 2024-01-08 | End: 2024-01-08

## 2024-01-08 RX ORDER — SERTRALINE 25 MG/1
1 TABLET, FILM COATED ORAL
Refills: 0 | DISCHARGE

## 2024-01-08 RX ORDER — LEVOTHYROXINE SODIUM 125 MCG
100 TABLET ORAL DAILY
Refills: 0 | Status: DISCONTINUED | OUTPATIENT
Start: 2024-01-09 | End: 2024-01-09

## 2024-01-08 RX ORDER — ASPIRIN/CALCIUM CARB/MAGNESIUM 324 MG
81 TABLET ORAL AT BEDTIME
Refills: 0 | Status: DISCONTINUED | OUTPATIENT
Start: 2024-01-08 | End: 2024-01-09

## 2024-01-08 RX ORDER — CEFAZOLIN SODIUM 1 G
2000 VIAL (EA) INJECTION EVERY 8 HOURS
Refills: 0 | Status: COMPLETED | OUTPATIENT
Start: 2024-01-08 | End: 2024-01-09

## 2024-01-08 RX ORDER — METFORMIN HYDROCHLORIDE 850 MG/1
1 TABLET ORAL
Refills: 0 | DISCHARGE

## 2024-01-08 RX ORDER — PANTOPRAZOLE SODIUM 20 MG/1
40 TABLET, DELAYED RELEASE ORAL
Refills: 0 | Status: DISCONTINUED | OUTPATIENT
Start: 2024-01-08 | End: 2024-01-09

## 2024-01-08 RX ORDER — CARVEDILOL PHOSPHATE 80 MG/1
25 CAPSULE, EXTENDED RELEASE ORAL EVERY 12 HOURS
Refills: 0 | Status: DISCONTINUED | OUTPATIENT
Start: 2024-01-08 | End: 2024-01-09

## 2024-01-08 RX ORDER — ATORVASTATIN CALCIUM 80 MG/1
20 TABLET, FILM COATED ORAL AT BEDTIME
Refills: 0 | Status: DISCONTINUED | OUTPATIENT
Start: 2024-01-08 | End: 2024-01-09

## 2024-01-08 RX ORDER — DILTIAZEM HCL 120 MG
1 CAPSULE, EXT RELEASE 24 HR ORAL
Refills: 0 | DISCHARGE

## 2024-01-08 RX ORDER — CARVEDILOL PHOSPHATE 80 MG/1
25 CAPSULE, EXTENDED RELEASE ORAL EVERY 12 HOURS
Refills: 0 | Status: DISCONTINUED | OUTPATIENT
Start: 2024-01-08 | End: 2024-01-08

## 2024-01-08 RX ORDER — FENOFIBRIC ACID 105 MG/1
1 TABLET ORAL
Refills: 0 | DISCHARGE

## 2024-01-08 RX ORDER — DILTIAZEM HCL 120 MG
240 CAPSULE, EXT RELEASE 24 HR ORAL AT BEDTIME
Refills: 0 | Status: DISCONTINUED | OUTPATIENT
Start: 2024-01-08 | End: 2024-01-09

## 2024-01-08 RX ORDER — NEBIVOLOL HYDROCHLORIDE 5 MG/1
1 TABLET ORAL
Refills: 0 | DISCHARGE

## 2024-01-08 RX ORDER — GLUCAGON INJECTION, SOLUTION 0.5 MG/.1ML
1 INJECTION, SOLUTION SUBCUTANEOUS ONCE
Refills: 0 | Status: DISCONTINUED | OUTPATIENT
Start: 2024-01-08 | End: 2024-01-09

## 2024-01-08 RX ORDER — DEXTROSE 50 % IN WATER 50 %
15 SYRINGE (ML) INTRAVENOUS ONCE
Refills: 0 | Status: DISCONTINUED | OUTPATIENT
Start: 2024-01-08 | End: 2024-01-09

## 2024-01-08 RX ORDER — GABAPENTIN 400 MG/1
1 CAPSULE ORAL
Refills: 0 | DISCHARGE

## 2024-01-08 RX ADMIN — Medication 1000 MILLIGRAM(S): at 15:39

## 2024-01-08 RX ADMIN — Medication 100 MILLIGRAM(S): at 15:25

## 2024-01-08 RX ADMIN — Medication 240 MILLIGRAM(S): at 21:20

## 2024-01-08 RX ADMIN — ATORVASTATIN CALCIUM 20 MILLIGRAM(S): 80 TABLET, FILM COATED ORAL at 21:20

## 2024-01-08 RX ADMIN — Medication 81 MILLIGRAM(S): at 21:19

## 2024-01-08 RX ADMIN — GABAPENTIN 300 MILLIGRAM(S): 400 CAPSULE ORAL at 21:19

## 2024-01-08 RX ADMIN — Medication 1000 MILLIGRAM(S): at 16:07

## 2024-01-08 RX ADMIN — CARVEDILOL PHOSPHATE 25 MILLIGRAM(S): 80 CAPSULE, EXTENDED RELEASE ORAL at 16:46

## 2024-01-08 RX ADMIN — PANTOPRAZOLE SODIUM 40 MILLIGRAM(S): 20 TABLET, DELAYED RELEASE ORAL at 21:20

## 2024-01-08 RX ADMIN — Medication 100 GRAM(S): at 12:33

## 2024-01-08 NOTE — CONSULT NOTE ADULT - ATTENDING COMMENTS
73 year old woman with asymptomatic carotid stenosis (R >70%; left <50%) presenting s/p elective right CEA.  On evaluation noted to have right tongue deviation and mild left facial droop on arrival from OR. Vascular surgery team at bedside.  Otherwise on exam symmetric strength and sensation in bilateral upper and lower extremities, pupils equal, round and reactive to light. No other neurologic findings noted. Tongue deviation and droop subsequently resolved.  Decision making high complexity

## 2024-01-08 NOTE — BRIEF OPERATIVE NOTE - OPERATION/FINDINGS
Procedure: R CEA  Indication: Asymptomatic Carotid Artery Stenosis  Description: R CEA performed in usual fashion with patch angioplasty under cervical block. Hypoglossal and vagus nerves identified and protected. Patient heparinized. Hemostasis achieved and incision closed in layers. Patient awoke with no neurological deficits from anesthesia.  IVF:  EBL: Procedure: R CEA  Indication: Asymptomatic Carotid Artery Stenosis  Description: R CEA performed in usual fashion with patch angioplasty under cervical block. Hypoglossal and vagus nerves identified and protected. Patient heparinized. Hemostasis achieved and incision closed in layers. Patient awoke with no neurological deficits from anesthesia.  IVF:  600cc  EBL: 100

## 2024-01-08 NOTE — H&P ADULT - ASSESSMENT
74 y/o F with PMHx of HTN, HLD, DM, CAD s/p stents x2, former smoker, and PSH of cervical spine, knee and ovarian surgeries, as well as known carotid stenosis, asymptomatic. Carotid duplex showed R ICA >70% stenosis (278/86 cm/s). L ICA <50% stenosis. She is now s/p R CEA    #MAURI  - s/p CEA  - neurochecks q15 for 1 hr, q30 for 2 hr, q1hr for 5 hrs  - monitor for headaches or neck hematoma  - ancef x 2 doses  - SBP goals 110-160  - NPO for 4 hours then CLD  - IVF @ 75cc  - TOV  - c/w ASA/Statin  - STAT labs    #CAD  - c/w statin and beta blocker    #DM  - c/w ISS    #HTN  - c/w home meds when able    #HLD  - c/w home statin      DVT ppx: patient heparinized in OR  Dispo: 5 [employer] tele

## 2024-01-08 NOTE — BRIEF OPERATIVE NOTE - NSICDXBRIEFPOSTOP_GEN_ALL_CORE_FT
POST-OP DIAGNOSIS:  Asymptomatic stenosis of right carotid artery 08-Jan-2024 07:19:51  Autumn Rose

## 2024-01-08 NOTE — BRIEF OPERATIVE NOTE - NSICDXBRIEFPREOP_GEN_ALL_CORE_FT
PRE-OP DIAGNOSIS:  Asymptomatic stenosis of right carotid artery 08-Jan-2024 07:19:43  Autumn Rose

## 2024-01-08 NOTE — H&P ADULT - HISTORY OF PRESENT ILLNESS
Attending: GEMA Ojeda, 73y, Female  MRN:  [id number]  Occupation: Housewife    HPI:    72 y/o F with PMHx of HTN, HLD, CAD s/p stents x2, former smoker, and PSH of cervical spine, knee and ovarian surgeries, as well as known carotid stenosis, asymptomatic. She was recently seen at the [facility] office, Dr KELLEY Matthews's office, for follow-up of the carotid arteries. An ultrasound was done and demonstrated worsening R sided stenosis. Carotid duplex showed R ICA >70% stenosis (278/86 cm/s). L ICA <50% stenosis.She also completed cardiac evaluation and she was informed it was benign. Denies any neurological symptoms. She has a strong family hx of stroke and is concerned about it. She is on statin and recently held ASA as she was instructed not to take it by Dr Matthews. She denies any neurological symptoms. She presents for elective R CEA          PAST MEDICAL & SURGICAL HISTORY:  CAD (coronary artery disease)  has 2 cardiac stents      Hypothyroidism      COPD, mild      GERD (gastroesophageal reflux disease)      Hypertension      Hypercholesterolemia      Anxiety      Arthropathy  right shoulder in       Arthropathy  left knee      Diabetes       delivery delivered  and       H/O ovarian cystectomy  left side 15 years ago      H/O cervical spine surgery  fusion in       Multiparity  b/l tubal ligation      H/O induced   D&C      Arthropathy  left knee surgery      Coronary artery disease  has h/o 2 cardiac stents      H/O shoulder surgery  RIGHT ROTATOR CUFF          Home Medications:  aspirin 81 mg oral capsule: 1 cap(s) orally once a day (2024 09:59)  atorvastatin 20 mg oral tablet: 1 tab(s) orally once a day (2024 09:59)  Bystolic 10 mg oral tablet: 1 tab(s) orally once a day (2024 09:59)  DilTIAZem (Eqv-Dilacor XR) 240 mg/24 hours oral capsule, extended release: 1 cap(s) orally once a day (2024 09:59)  folic acid 1 mg oral tablet: 1 tab(s) orally once a day (2024 10:00)  gabapentin 300 mg oral capsule: 1 cap(s) orally once a day (2024 10:00)  hydroCHLOROthiazide 12.5 mg oral capsule: 1 cap(s) orally once a day (2024 10:00)  levothyroxine 100 mcg (0.1 mg) oral capsule: 1 cap(s) orally once a day (2024 10:00)  losartan 50 mg oral tablet: 1 tab(s) orally once a day (2024 10:00)  metFORMIN 500 mg oral tablet: 1 tab(s) orally 2 times a day (2024 09:59)  omeprazole 20 mg oral delayed release capsule: 1 cap(s) orally once a day (2024 09:59)  sertraline 50 mg oral tablet: 1 tab(s) orally once a day (2024 09:59)  Trilipix 135 mg oral delayed release capsule: 1 cap(s) orally once a day (2024 09:59)  Trulicity Pen 3 mg/0.5 mL subcutaneous solution: 3 milligram(s) subcutaneously once a week (2024 09:59)          Vitals (Last 12 Hours)  T(F): --  HR: --  BP: --  RR: --  SpO2: --        PHYSICAL EXAM:  General: NAD, resting comfortably  Pulmonary: Nonlabored breathing, no respiratory distress, saturating ______  Cardiovascular:   Abdominal:   Extremities:     Pulse Exam:  - Right:  ----- DP:    ----- PT:  ----- POP:   ----- FEM:  Left:   ----- DP:   ----- PT:   ----- POP:   ----- FEM:         LABS:                    CAPILLARY BLOOD GLUCOSE        CAPILLARY BLOOD GLUCOSE            Microbiology:        RADIOLOGY & ADDITIONAL STUDIES:   Attending: GEMA Ojeda, 73y, Female  MRN:  [id number]  Occupation: Housewife    HPI:    74 y/o F with PMHx of HTN, HLD, CAD s/p stents x2, former smoker, and PSH of cervical spine, knee and ovarian surgeries, as well as known carotid stenosis, asymptomatic. She was recently seen at the [facility], Dr KELLEY Matthews's office, for follow-up of the carotid arteries. An ultrasound was done and demonstrated worsening R sided stenosis. Carotid duplex showed R ICA >70% stenosis (278/86 cm/s). L ICA <50% stenosis.She also completed cardiac evaluation and she was informed it was benign. Denies any neurological symptoms. She has a strong family hx of stroke and is concerned about it. She is on statin and recently held ASA as she was instructed not to take it by Dr Matthews. She denies any neurological symptoms. She presents for elective R CEA          PAST MEDICAL & SURGICAL HISTORY:  CAD (coronary artery disease)  has 2 cardiac stents      Hypothyroidism      COPD, mild      GERD (gastroesophageal reflux disease)      Hypertension      Hypercholesterolemia      Anxiety      Arthropathy  right shoulder in       Arthropathy  left knee      Diabetes       delivery delivered  and       H/O ovarian cystectomy  left side 15 years ago      H/O cervical spine surgery  fusion in       Multiparity  b/l tubal ligation      H/O induced   D&C      Arthropathy  left knee surgery      Coronary artery disease  has h/o 2 cardiac stents      H/O shoulder surgery  RIGHT ROTATOR CUFF          Home Medications:  aspirin 81 mg oral capsule: 1 cap(s) orally once a day (2024 09:59)  atorvastatin 20 mg oral tablet: 1 tab(s) orally once a day (2024 09:59)  Bystolic 10 mg oral tablet: 1 tab(s) orally once a day (2024 09:59)  DilTIAZem (Eqv-Dilacor XR) 240 mg/24 hours oral capsule, extended release: 1 cap(s) orally once a day (2024 09:59)  folic acid 1 mg oral tablet: 1 tab(s) orally once a day (2024 10:00)  gabapentin 300 mg oral capsule: 1 cap(s) orally once a day (2024 10:00)  hydroCHLOROthiazide 12.5 mg oral capsule: 1 cap(s) orally once a day (2024 10:00)  levothyroxine 100 mcg (0.1 mg) oral capsule: 1 cap(s) orally once a day (2024 10:00)  losartan 50 mg oral tablet: 1 tab(s) orally once a day (2024 10:00)  metFORMIN 500 mg oral tablet: 1 tab(s) orally 2 times a day (2024 09:59)  omeprazole 20 mg oral delayed release capsule: 1 cap(s) orally once a day (2024 09:59)  sertraline 50 mg oral tablet: 1 tab(s) orally once a day (2024 09:59)  Trilipix 135 mg oral delayed release capsule: 1 cap(s) orally once a day (2024 09:59)  Trulicity Pen 3 mg/0.5 mL subcutaneous solution: 3 milligram(s) subcutaneously once a week (2024 09:59)          Vitals (Last 12 Hours)  T(F): --  HR: --  BP: --  RR: --  SpO2: --        PHYSICAL EXAM:  General: NAD, resting comfortably  Pulmonary: Nonlabored breathing, no respiratory distress, saturating ______  Cardiovascular:   Abdominal:   Extremities:     Pulse Exam:  - Right:  ----- DP:    ----- PT:  ----- POP:   ----- FEM:  Left:   ----- DP:   ----- PT:   ----- POP:   ----- FEM:         LABS:                    CAPILLARY BLOOD GLUCOSE        CAPILLARY BLOOD GLUCOSE            Microbiology:        RADIOLOGY & ADDITIONAL STUDIES:

## 2024-01-08 NOTE — CONSULT NOTE ADULT - SUBJECTIVE AND OBJECTIVE BOX
SICU CONSULT    HPI:  Attending: Dr. Curt BLACKMON GEMA, 73y, Female  MRN:  [id number]  Occupation: Housewife    HPI:    72 y/o F with PMHx of HTN, HLD, CAD s/p stents x2, former smoker, and PSH of cervical spine, knee and ovarian surgeries, as well as known carotid stenosis, asymptomatic. She was recently seen at the  office, Dr KELLEY Matthews's office, for follow-up of the carotid arteries. An ultrasound was done and demonstrated worsening R sided stenosis. Carotid duplex showed R ICA >70% stenosis (278/86 cm/s). L ICA <50% stenosis.She also completed cardiac evaluation and she was informed it was benign. Denies any neurological symptoms. She has a strong family hx of stroke and is concerned about it. She is on statin and recently held ASA as she was instructed not to take it by Dr Matthews. She denies any neurological symptoms. She presents for elective R CEA.    SICU ADDENDUM:          PAST MEDICAL & SURGICAL HISTORY:  CAD (coronary artery disease)  has 2 cardiac stents      Hypothyroidism      COPD, mild      GERD (gastroesophageal reflux disease)      Hypertension      Hypercholesterolemia      Anxiety      Arthropathy  right shoulder in       Arthropathy  left knee      Diabetes       delivery delivered  and       H/O ovarian cystectomy  left side 15 years ago      H/O cervical spine surgery  fusion in       Multiparity  b/l tubal ligation      H/O induced   D&C      Arthropathy  left knee surgery      Coronary artery disease  has h/o 2 cardiac stents      H/O shoulder surgery  RIGHT ROTATOR CUFF          Home Medications:  aspirin 81 mg oral capsule: 1 cap(s) orally once a day (2024 09:59)  atorvastatin 20 mg oral tablet: 1 tab(s) orally once a day (2024 09:59)  Bystolic 10 mg oral tablet: 1 tab(s) orally once a day (2024 09:59)  DilTIAZem (Eqv-Dilacor XR) 240 mg/24 hours oral capsule, extended release: 1 cap(s) orally once a day (2024 09:59)  folic acid 1 mg oral tablet: 1 tab(s) orally once a day (2024 10:00)  gabapentin 300 mg oral capsule: 1 cap(s) orally once a day (2024 10:00)  hydroCHLOROthiazide 12.5 mg oral capsule: 1 cap(s) orally once a day (2024 10:00)  levothyroxine 100 mcg (0.1 mg) oral capsule: 1 cap(s) orally once a day (2024 10:00)  losartan 50 mg oral tablet: 1 tab(s) orally once a day (2024 10:00)  metFORMIN 500 mg oral tablet: 1 tab(s) orally 2 times a day (2024 09:59)  omeprazole 20 mg oral delayed release capsule: 1 cap(s) orally once a day (2024 09:59)  sertraline 50 mg oral tablet: 1 tab(s) orally once a day (2024 09:59)  Trilipix 135 mg oral delayed release capsule: 1 cap(s) orally once a day (2024 09:59)  Trulicity Pen 3 mg/0.5 mL subcutaneous solution: 3 milligram(s) subcutaneously once a week (2024 09:59)    PAST MEDICAL & SURGICAL HISTORY:  CAD (coronary artery disease)  has 2 cardiac stents      Hypothyroidism      COPD, mild      GERD (gastroesophageal reflux disease)      Hypertension      Hypercholesterolemia      Anxiety      Arthropathy  right shoulder in       Arthropathy  left knee      Diabetes       delivery delivered  and       H/O ovarian cystectomy  left side 15 years ago      H/O cervical spine surgery  fusion in       Multiparity  b/l tubal ligation      H/O induced   D&C      Arthropathy  left knee surgery      Coronary artery disease  has h/o 2 cardiac stents      H/O shoulder surgery  RIGHT ROTATOR CUFF        Home Meds: Home Medications:  aspirin 81 mg oral capsule: 1 cap(s) orally once a day (2024 07:15)  atorvastatin 20 mg oral tablet: 1 tab(s) orally once a day (2024 07:15)  Bystolic 10 mg oral tablet: 1 tab(s) orally once a day (2024 07:15)  DilTIAZem (Eqv-Dilacor XR) 240 mg/24 hours oral capsule, extended release: 1 cap(s) orally once a day (2024 07:15)  folic acid 1 mg oral tablet: 1 tab(s) orally once a day (2024 07:15)  gabapentin 300 mg oral capsule: 1 cap(s) orally once a day (2024 07:15)  hydroCHLOROthiazide 12.5 mg oral capsule: 1 cap(s) orally once a day (2024 07:15)  levothyroxine 100 mcg (0.1 mg) oral capsule: 1 cap(s) orally once a day (2024 07:15)  losartan 50 mg oral tablet: 1 tab(s) orally once a day (2024 07:15)  metFORMIN 500 mg oral tablet: 1 tab(s) orally 2 times a day (:15)  omeprazole 20 mg oral delayed release capsule: 1 cap(s) orally once a day (:15)  sertraline 50 mg oral tablet: 1 tab(s) orally once a day (:15)  Trilipix 135 mg oral delayed release capsule: 1 cap(s) orally once a day (:15)  Trulicity Pen 3 mg/0.5 mL subcutaneous solution: 3 milligram(s) subcutaneously once a week (2024 07:15)    Allergies: Allergies    No Known Allergies    Intolerances      Soc:   Advanced Directives: Presumed Full Code     CURRENT MEDICATIONS:   --------------------------------------------------------------------------------------  Neurologic Medications  gabapentin 300 milliGRAM(s) Oral at bedtime    Respiratory Medications    Cardiovascular Medications  carvedilol 25 milliGRAM(s) Oral every 12 hours  diltiazem    milliGRAM(s) Oral at bedtime    Gastrointestinal Medications  dextrose 5%. 1000 milliLiter(s) IV Continuous <Continuous>  dextrose 5%. 1000 milliLiter(s) IV Continuous <Continuous>  pantoprazole    Tablet 40 milliGRAM(s) Oral before breakfast  sodium chloride 0.9%. 1000 milliLiter(s) IV Continuous <Continuous>    Genitourinary Medications    Hematologic/Oncologic Medications  aspirin  chewable 81 milliGRAM(s) Oral at bedtime    Antimicrobial/Immunologic Medications  ceFAZolin   IVPB 2000 milliGRAM(s) IV Intermittent every 8 hours    Endocrine/Metabolic Medications  atorvastatin 20 milliGRAM(s) Oral at bedtime  dextrose 50% Injectable 25 Gram(s) IV Push once  dextrose 50% Injectable 25 Gram(s) IV Push once  dextrose 50% Injectable 12.5 Gram(s) IV Push once  dextrose Oral Gel 15 Gram(s) Oral once PRN Blood Glucose LESS THAN 70 milliGRAM(s)/deciliter  glucagon  Injectable 1 milliGRAM(s) IntraMuscular once  insulin lispro (ADMELOG) corrective regimen sliding scale   SubCutaneous Before meals and at bedtime    Topical/Other Medications    --------------------------------------------------------------------------------------    VITAL SIGNS, INS/OUTS (last 24 hours):  --------------------------------------------------------------------------------------  ICU Vital Signs Last 24 Hrs  T(C): 35.9 (2024 07:22), Max: 35.9 (2024 07:22)  HR: 57 (2024 07:22) (57 - 57)  BP: 156/76 (2024 07:22) (156/76 - 156/76)  RR: 16 (2024 07:22) (16 - 16)  SpO2: 98% (2024 07:22) (98% - 98%)      I&O's Summary    --------------------------------------------------------------------------------------    EXAM:  General: Resting comfortably in bed, NAD  Neuro: A&Ox3, Slight facial droop noted on R with tongue deviation to R on protrusion. No additional neuro deficits noted  HEENT: Neck supple. R neck incision noted with dressing CDI - no hematoma or swelling noted  Pulmonary: Nonlabored breathing, no respiratory distress or accessory muscle noted. Lungs clear to auscultation bilaterally  Cardiovascular: NSR  Abdomen: Soft, nondistended, nontender  Extremities WWP, B/L UE and LE strength noted to be 5/5  MAXIMUS: No joint swelling or erythema appreciated  Skin: Dry, no rashes  Psychiatric: Goal-oriented thought, normal affect    LABS  --------------------------------------------------------------------------------------  Labs:  CAPILLARY BLOOD GLUCOSE      POCT Blood Glucose.: 86 mg/dL (2024 07:34)  POCT Blood Glucose.: 63 mg/dL (2024 07:32)         SICU CONSULT    HPI:  Attending: Dr. Curt BLACKMON GEMA, 73y, Female  MRN:  [id number]  Occupation: Housewife    HPI:    72 y/o F with PMHx of HTN, HLD, CAD s/p stents x2, former smoker, and PSH of cervical spine, knee and ovarian surgeries, as well as known carotid stenosis, asymptomatic. She was recently seen at the  office, Dr KELLEY Matthews's office, for follow-up of the carotid arteries. An ultrasound was done and demonstrated worsening R sided stenosis. Carotid duplex showed R ICA >70% stenosis (278/86 cm/s). L ICA <50% stenosis.She also completed cardiac evaluation and she was informed it was benign. Denies any neurological symptoms. She has a strong family hx of stroke and is concerned about it. She is on statin and recently held ASA as she was instructed not to take it by Dr Matthews. She denies any neurological symptoms. She presents for elective R CEA.    SICU ADDENDUM:          PAST MEDICAL & SURGICAL HISTORY:  CAD (coronary artery disease)  has 2 cardiac stents      Hypothyroidism      COPD, mild      GERD (gastroesophageal reflux disease)      Hypertension      Hypercholesterolemia      Anxiety      Arthropathy  right shoulder in       Arthropathy  left knee      Diabetes       delivery delivered  and       H/O ovarian cystectomy  left side 15 years ago      H/O cervical spine surgery  fusion in       Multiparity  b/l tubal ligation      H/O induced   D&C      Arthropathy  left knee surgery      Coronary artery disease  has h/o 2 cardiac stents      H/O shoulder surgery  RIGHT ROTATOR CUFF          Home Medications:  aspirin 81 mg oral capsule: 1 cap(s) orally once a day (2024 09:59)  atorvastatin 20 mg oral tablet: 1 tab(s) orally once a day (2024 09:59)  Bystolic 10 mg oral tablet: 1 tab(s) orally once a day (2024 09:59)  DilTIAZem (Eqv-Dilacor XR) 240 mg/24 hours oral capsule, extended release: 1 cap(s) orally once a day (2024 09:59)  folic acid 1 mg oral tablet: 1 tab(s) orally once a day (2024 10:00)  gabapentin 300 mg oral capsule: 1 cap(s) orally once a day (2024 10:00)  hydroCHLOROthiazide 12.5 mg oral capsule: 1 cap(s) orally once a day (2024 10:00)  levothyroxine 100 mcg (0.1 mg) oral capsule: 1 cap(s) orally once a day (2024 10:00)  losartan 50 mg oral tablet: 1 tab(s) orally once a day (2024 10:00)  metFORMIN 500 mg oral tablet: 1 tab(s) orally 2 times a day (2024 09:59)  omeprazole 20 mg oral delayed release capsule: 1 cap(s) orally once a day (2024 09:59)  sertraline 50 mg oral tablet: 1 tab(s) orally once a day (2024 09:59)  Trilipix 135 mg oral delayed release capsule: 1 cap(s) orally once a day (2024 09:59)  Trulicity Pen 3 mg/0.5 mL subcutaneous solution: 3 milligram(s) subcutaneously once a week (2024 09:59)    PAST MEDICAL & SURGICAL HISTORY:  CAD (coronary artery disease)  has 2 cardiac stents      Hypothyroidism      COPD, mild      GERD (gastroesophageal reflux disease)      Hypertension      Hypercholesterolemia      Anxiety      Arthropathy  right shoulder in       Arthropathy  left knee      Diabetes       delivery delivered  and       H/O ovarian cystectomy  left side 15 years ago      H/O cervical spine surgery  fusion in       Multiparity  b/l tubal ligation      H/O induced   D&C      Arthropathy  left knee surgery      Coronary artery disease  has h/o 2 cardiac stents      H/O shoulder surgery  RIGHT ROTATOR CUFF        Home Meds: Home Medications:  aspirin 81 mg oral capsule: 1 cap(s) orally once a day (2024 07:15)  atorvastatin 20 mg oral tablet: 1 tab(s) orally once a day (2024 07:15)  Bystolic 10 mg oral tablet: 1 tab(s) orally once a day (2024 07:15)  DilTIAZem (Eqv-Dilacor XR) 240 mg/24 hours oral capsule, extended release: 1 cap(s) orally once a day (2024 07:15)  folic acid 1 mg oral tablet: 1 tab(s) orally once a day (2024 07:15)  gabapentin 300 mg oral capsule: 1 cap(s) orally once a day (2024 07:15)  hydroCHLOROthiazide 12.5 mg oral capsule: 1 cap(s) orally once a day (2024 07:15)  levothyroxine 100 mcg (0.1 mg) oral capsule: 1 cap(s) orally once a day (2024 07:15)  losartan 50 mg oral tablet: 1 tab(s) orally once a day (2024 07:15)  metFORMIN 500 mg oral tablet: 1 tab(s) orally 2 times a day (:15)  omeprazole 20 mg oral delayed release capsule: 1 cap(s) orally once a day (:15)  sertraline 50 mg oral tablet: 1 tab(s) orally once a day (:15)  Trilipix 135 mg oral delayed release capsule: 1 cap(s) orally once a day (:15)  Trulicity Pen 3 mg/0.5 mL subcutaneous solution: 3 milligram(s) subcutaneously once a week (2024 07:15)    Allergies: Allergies    No Known Allergies    Intolerances      Soc:   Advanced Directives: Presumed Full Code     CURRENT MEDICATIONS:   --------------------------------------------------------------------------------------  Neurologic Medications  gabapentin 300 milliGRAM(s) Oral at bedtime    Respiratory Medications    Cardiovascular Medications  carvedilol 25 milliGRAM(s) Oral every 12 hours  diltiazem    milliGRAM(s) Oral at bedtime    Gastrointestinal Medications  dextrose 5%. 1000 milliLiter(s) IV Continuous <Continuous>  dextrose 5%. 1000 milliLiter(s) IV Continuous <Continuous>  pantoprazole    Tablet 40 milliGRAM(s) Oral before breakfast  sodium chloride 0.9%. 1000 milliLiter(s) IV Continuous <Continuous>    Genitourinary Medications    Hematologic/Oncologic Medications  aspirin  chewable 81 milliGRAM(s) Oral at bedtime    Antimicrobial/Immunologic Medications  ceFAZolin   IVPB 2000 milliGRAM(s) IV Intermittent every 8 hours    Endocrine/Metabolic Medications  atorvastatin 20 milliGRAM(s) Oral at bedtime  dextrose 50% Injectable 25 Gram(s) IV Push once  dextrose 50% Injectable 25 Gram(s) IV Push once  dextrose 50% Injectable 12.5 Gram(s) IV Push once  dextrose Oral Gel 15 Gram(s) Oral once PRN Blood Glucose LESS THAN 70 milliGRAM(s)/deciliter  glucagon  Injectable 1 milliGRAM(s) IntraMuscular once  insulin lispro (ADMELOG) corrective regimen sliding scale   SubCutaneous Before meals and at bedtime    Topical/Other Medications    --------------------------------------------------------------------------------------    VITAL SIGNS, INS/OUTS (last 24 hours):  --------------------------------------------------------------------------------------  ICU Vital Signs Last 24 Hrs  T(C): 35.9 (2024 07:22), Max: 35.9 (2024 07:22)  HR: 57 (2024 07:22) (57 - 57)  BP: 156/76 (2024 07:22) (156/76 - 156/76)  RR: 16 (2024 07:22) (16 - 16)  SpO2: 98% (2024 07:22) (98% - 98%)      I&O's Summary    --------------------------------------------------------------------------------------    EXAM:  General: Resting comfortably in bed, NAD  Neuro: A&Ox3, Slight facial droop noted on R with tongue deviation to R on protrusion. No additional neuro deficits noted  HEENT: Neck supple. R neck incision noted with dressing CDI - no hematoma or swelling noted  Pulmonary: Nonlabored breathing, no respiratory distress or accessory muscle noted. Lungs clear to auscultation bilaterally  Cardiovascular: NSR  Abdomen: Soft, nondistended, nontender  Extremities WWP, B/L UE and LE strength noted to be 5/5  MAXIMUS: No joint swelling or erythema appreciated  Skin: Dry, no rashes  Psychiatric: Goal-oriented thought, normal affect    LABS  --------------------------------------------------------------------------------------  Labs:  CAPILLARY BLOOD GLUCOSE      POCT Blood Glucose.: 86 mg/dL (2024 07:34)  POCT Blood Glucose.: 63 mg/dL (2024 07:32)         SICU CONSULT    HPI:  Attending: GEMA Ojeda, 73y, Female  MRN:  [id number]  Occupation: Housewife    HPI:    72 y/o F with PMHx of HTN, HLD, CAD s/p stents x2, former smoker, and PSH of cervical spine, knee and ovarian surgeries, as well as known carotid stenosis, asymptomatic. She was recently seen at the  office, Dr KELLEY Matthews's office, for follow-up of the carotid arteries. An ultrasound was done and demonstrated worsening R sided stenosis. Carotid duplex showed R ICA >70% stenosis (278/86 cm/s). L ICA <50% stenosis.She also completed cardiac evaluation and she was informed it was benign. Denies any neurological symptoms. She has a strong family hx of stroke and is concerned about it. She is on statin and recently held ASA as she was instructed not to take it by Dr Matthews. She denies any neurological symptoms. She presents for elective R CEA.    SICU ADDENDUM:  Patient is a 73F with PMHx of HTN, HLD, CAD (s/p stents on ASA, s/p + nuclear stress test with patents stents on [facility]), known b/l carotid stenosis (R>70%) with no prior neurologic deficits in the past and PSHx of cervical spine surgery, as well as ovarian and knee surgeries who presented to [facility] for elective R CEA under local cervical block. Procedure uncomplicated with EBL 50 cc, . 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 Transferred to SICU post operatively for frequent neuro checks and HD monitoring with SBP goal between 110-160.        PAST MEDICAL & SURGICAL HISTORY:  CAD (coronary artery disease)  has 2 cardiac stents      Hypothyroidism      COPD, mild      GERD (gastroesophageal reflux disease)      Hypertension      Hypercholesterolemia      Anxiety      Arthropathy  right shoulder in       Arthropathy  left knee      Diabetes       delivery delivered  and       H/O ovarian cystectomy  left side 15 years ago      H/O cervical spine surgery  fusion in       Multiparity  b/l tubal ligation      H/O induced   D&C      Arthropathy  left knee surgery      Coronary artery disease  has h/o 2 cardiac stents      H/O shoulder surgery  RIGHT ROTATOR CUFF          Home Medications:  aspirin 81 mg oral capsule: 1 cap(s) orally once a day (2024 09:59)  atorvastatin 20 mg oral tablet: 1 tab(s) orally once a day (2024 09:59)  Bystolic 10 mg oral tablet: 1 tab(s) orally once a day (2024 09:59)  DilTIAZem (Eqv-Dilacor XR) 240 mg/24 hours oral capsule, extended release: 1 cap(s) orally once a day (2024 09:59)  folic acid 1 mg oral tablet: 1 tab(s) orally once a day (2024 10:00)  gabapentin 300 mg oral capsule: 1 cap(s) orally once a day (2024 10:00)  hydroCHLOROthiazide 12.5 mg oral capsule: 1 cap(s) orally once a day (2024 10:00)  levothyroxine 100 mcg (0.1 mg) oral capsule: 1 cap(s) orally once a day (2024 10:00)  losartan 50 mg oral tablet: 1 tab(s) orally once a day (2024 10:00)  metFORMIN 500 mg oral tablet: 1 tab(s) orally 2 times a day (2024 09:59)  omeprazole 20 mg oral delayed release capsule: 1 cap(s) orally once a day (2024 09:59)  sertraline 50 mg oral tablet: 1 tab(s) orally once a day (2024 09:59)  Trilipix 135 mg oral delayed release capsule: 1 cap(s) orally once a day (2024 09:59)  Trulicity Pen 3 mg/0.5 mL subcutaneous solution: 3 milligram(s) subcutaneously once a week (2024 09:59)    PAST MEDICAL & SURGICAL HISTORY:  CAD (coronary artery disease)  has 2 cardiac stents      Hypothyroidism      COPD, mild      GERD (gastroesophageal reflux disease)      Hypertension      Hypercholesterolemia      Anxiety      Arthropathy  right shoulder in       Arthropathy  left knee      Diabetes       delivery delivered  and       H/O ovarian cystectomy  left side 15 years ago      H/O cervical spine surgery  fusion in       Multiparity  b/l tubal ligation      H/O induced   D&C      Arthropathy  left knee surgery      Coronary artery disease  has h/o 2 cardiac stents      H/O shoulder surgery  RIGHT ROTATOR CUFF        Home Meds: Home Medications:  aspirin 81 mg oral capsule: 1 cap(s) orally once a day (2024 07:15)  atorvastatin 20 mg oral tablet: 1 tab(s) orally once a day (:15)  Bystolic 10 mg oral tablet: 1 tab(s) orally once a day (:15)  DilTIAZem (Eqv-Dilacor XR) 240 mg/24 hours oral capsule, extended release: 1 cap(s) orally once a day (:15)  folic acid 1 mg oral tablet: 1 tab(s) orally once a day (:15)  gabapentin 300 mg oral capsule: 1 cap(s) orally once a day (:15)  hydroCHLOROthiazide 12.5 mg oral capsule: 1 cap(s) orally once a day (:15)  levothyroxine 100 mcg (0.1 mg) oral capsule: 1 cap(s) orally once a day (:15)  losartan 50 mg oral tablet: 1 tab(s) orally once a day (:15)  metFORMIN 500 mg oral tablet: 1 tab(s) orally 2 times a day (:15)  omeprazole 20 mg oral delayed release capsule: 1 cap(s) orally once a day (:15)  sertraline 50 mg oral tablet: 1 tab(s) orally once a day (:15)  Trilipix 135 mg oral delayed release capsule: 1 cap(s) orally once a day (:15)  Trulicity Pen 3 mg/0.5 mL subcutaneous solution: 3 milligram(s) subcutaneously once a week (:15)    Allergies: Allergies    No Known Allergies    Intolerances      Soc:   Advanced Directives: Presumed Full Code     CURRENT MEDICATIONS:   --------------------------------------------------------------------------------------  Neurologic Medications  gabapentin 300 milliGRAM(s) Oral at bedtime    Respiratory Medications    Cardiovascular Medications  carvedilol 25 milliGRAM(s) Oral every 12 hours  diltiazem    milliGRAM(s) Oral at bedtime    Gastrointestinal Medications  dextrose 5%. 1000 milliLiter(s) IV Continuous <Continuous>  dextrose 5%. 1000 milliLiter(s) IV Continuous <Continuous>  pantoprazole    Tablet 40 milliGRAM(s) Oral before breakfast  sodium chloride 0.9%. 1000 milliLiter(s) IV Continuous <Continuous>    Genitourinary Medications    Hematologic/Oncologic Medications  aspirin  chewable 81 milliGRAM(s) Oral at bedtime    Antimicrobial/Immunologic Medications  ceFAZolin   IVPB 2000 milliGRAM(s) IV Intermittent every 8 hours    Endocrine/Metabolic Medications  atorvastatin 20 milliGRAM(s) Oral at bedtime  dextrose 50% Injectable 25 Gram(s) IV Push once  dextrose 50% Injectable 25 Gram(s) IV Push once  dextrose 50% Injectable 12.5 Gram(s) IV Push once  dextrose Oral Gel 15 Gram(s) Oral once PRN Blood Glucose LESS THAN 70 milliGRAM(s)/deciliter  glucagon  Injectable 1 milliGRAM(s) IntraMuscular once  insulin lispro (ADMELOG) corrective regimen sliding scale   SubCutaneous Before meals and at bedtime    Topical/Other Medications    --------------------------------------------------------------------------------------    VITAL SIGNS, INS/OUTS (last 24 hours):  --------------------------------------------------------------------------------------  ICU Vital Signs Last 24 Hrs  T(C): 35.9 (2024 07:22), Max: 35.9 (2024 07:22)  HR: 57 (2024 07:22) (57 - 57)  BP: 156/76 (2024 07:22) (156/76 - 156/76)  RR: 16 (2024 07:22) (16 - 16)  SpO2: 98% (2024 07:22) (98% - 98%)      I&O's Summary    --------------------------------------------------------------------------------------    EXAM:  General: Resting comfortably in bed, NAD  Neuro: A&Ox3, Slight facial droop noted on R with tongue deviation to R on protrusion. No additional neuro deficits noted  HEENT: Neck supple. R neck incision noted with dressing CDI - no hematoma or swelling noted  Pulmonary: Nonlabored breathing, no respiratory distress or accessory muscle noted. Lungs clear to auscultation bilaterally  Cardiovascular: NSR  Abdomen: Soft, nondistended, nontender  Extremities WWP, B/L UE and LE strength noted to be 5/5  MAXIMUS: No joint swelling or erythema appreciated  Skin: Dry, no rashes  Psychiatric: Goal-oriented thought, normal affect    LABS  --------------------------------------------------------------------------------------  Labs:  CAPILLARY BLOOD GLUCOSE      POCT Blood Glucose.: 86 mg/dL (2024 07:34)  POCT Blood Glucose.: 63 mg/dL (2024 07:32)         SICU CONSULT    HPI:  Attending: GEMA Ojeda, 73y, Female  MRN:  [id number]  Occupation: Housewife    HPI:    72 y/o F with PMHx of HTN, HLD, CAD s/p stents x2, former smoker, and PSH of cervical spine, knee and ovarian surgeries, as well as known carotid stenosis, asymptomatic. She was recently seen at the  office, Dr KELLEY Matthews's office, for follow-up of the carotid arteries. An ultrasound was done and demonstrated worsening R sided stenosis. Carotid duplex showed R ICA >70% stenosis (278/86 cm/s). L ICA <50% stenosis.She also completed cardiac evaluation and she was informed it was benign. Denies any neurological symptoms. She has a strong family hx of stroke and is concerned about it. She is on statin and recently held ASA as she was instructed not to take it by Dr Matthews. She denies any neurological symptoms. She presents for elective R CEA.    SICU ADDENDUM:  Patient is a 73F with PMHx of HTN, HLD, CAD (s/p stents on ASA, s/p + nuclear stress test with patents stents on [facility]), known b/l carotid stenosis (R>70%) with no prior neurologic deficits in the past and PSHx of cervical spine surgery, as well as ovarian and knee surgeries who presented to [facility] for elective R CEA under local cervical block. Procedure uncomplicated with EBL 50 cc, . 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 Transferred to SICU post operatively for frequent neuro checks and HD monitoring with SBP goal between 110-160.        PAST MEDICAL & SURGICAL HISTORY:  CAD (coronary artery disease)  has 2 cardiac stents      Hypothyroidism      COPD, mild      GERD (gastroesophageal reflux disease)      Hypertension      Hypercholesterolemia      Anxiety      Arthropathy  right shoulder in       Arthropathy  left knee      Diabetes       delivery delivered  and       H/O ovarian cystectomy  left side 15 years ago      H/O cervical spine surgery  fusion in       Multiparity  b/l tubal ligation      H/O induced   D&C      Arthropathy  left knee surgery      Coronary artery disease  has h/o 2 cardiac stents      H/O shoulder surgery  RIGHT ROTATOR CUFF          Home Medications:  aspirin 81 mg oral capsule: 1 cap(s) orally once a day (2024 09:59)  atorvastatin 20 mg oral tablet: 1 tab(s) orally once a day (2024 09:59)  Bystolic 10 mg oral tablet: 1 tab(s) orally once a day (2024 09:59)  DilTIAZem (Eqv-Dilacor XR) 240 mg/24 hours oral capsule, extended release: 1 cap(s) orally once a day (2024 09:59)  folic acid 1 mg oral tablet: 1 tab(s) orally once a day (2024 10:00)  gabapentin 300 mg oral capsule: 1 cap(s) orally once a day (2024 10:00)  hydroCHLOROthiazide 12.5 mg oral capsule: 1 cap(s) orally once a day (2024 10:00)  levothyroxine 100 mcg (0.1 mg) oral capsule: 1 cap(s) orally once a day (2024 10:00)  losartan 50 mg oral tablet: 1 tab(s) orally once a day (2024 10:00)  metFORMIN 500 mg oral tablet: 1 tab(s) orally 2 times a day (2024 09:59)  omeprazole 20 mg oral delayed release capsule: 1 cap(s) orally once a day (2024 09:59)  sertraline 50 mg oral tablet: 1 tab(s) orally once a day (2024 09:59)  Trilipix 135 mg oral delayed release capsule: 1 cap(s) orally once a day (2024 09:59)  Trulicity Pen 3 mg/0.5 mL subcutaneous solution: 3 milligram(s) subcutaneously once a week (2024 09:59)    PAST MEDICAL & SURGICAL HISTORY:  CAD (coronary artery disease)  has 2 cardiac stents      Hypothyroidism      COPD, mild      GERD (gastroesophageal reflux disease)      Hypertension      Hypercholesterolemia      Anxiety      Arthropathy  right shoulder in       Arthropathy  left knee      Diabetes       delivery delivered  and       H/O ovarian cystectomy  left side 15 years ago      H/O cervical spine surgery  fusion in       Multiparity  b/l tubal ligation      H/O induced   D&C      Arthropathy  left knee surgery      Coronary artery disease  has h/o 2 cardiac stents      H/O shoulder surgery  RIGHT ROTATOR CUFF        Home Meds: Home Medications:  aspirin 81 mg oral capsule: 1 cap(s) orally once a day (2024 07:15)  atorvastatin 20 mg oral tablet: 1 tab(s) orally once a day (:15)  Bystolic 10 mg oral tablet: 1 tab(s) orally once a day (:15)  DilTIAZem (Eqv-Dilacor XR) 240 mg/24 hours oral capsule, extended release: 1 cap(s) orally once a day (:15)  folic acid 1 mg oral tablet: 1 tab(s) orally once a day (:15)  gabapentin 300 mg oral capsule: 1 cap(s) orally once a day (:15)  hydroCHLOROthiazide 12.5 mg oral capsule: 1 cap(s) orally once a day (:15)  levothyroxine 100 mcg (0.1 mg) oral capsule: 1 cap(s) orally once a day (:15)  losartan 50 mg oral tablet: 1 tab(s) orally once a day (:15)  metFORMIN 500 mg oral tablet: 1 tab(s) orally 2 times a day (:15)  omeprazole 20 mg oral delayed release capsule: 1 cap(s) orally once a day (:15)  sertraline 50 mg oral tablet: 1 tab(s) orally once a day (:15)  Trilipix 135 mg oral delayed release capsule: 1 cap(s) orally once a day (:15)  Trulicity Pen 3 mg/0.5 mL subcutaneous solution: 3 milligram(s) subcutaneously once a week (:15)    Allergies: Allergies    No Known Allergies    Intolerances      Soc:   Advanced Directives: Presumed Full Code     CURRENT MEDICATIONS:   --------------------------------------------------------------------------------------  Neurologic Medications  gabapentin 300 milliGRAM(s) Oral at bedtime    Respiratory Medications    Cardiovascular Medications  carvedilol 25 milliGRAM(s) Oral every 12 hours  diltiazem    milliGRAM(s) Oral at bedtime    Gastrointestinal Medications  dextrose 5%. 1000 milliLiter(s) IV Continuous <Continuous>  dextrose 5%. 1000 milliLiter(s) IV Continuous <Continuous>  pantoprazole    Tablet 40 milliGRAM(s) Oral before breakfast  sodium chloride 0.9%. 1000 milliLiter(s) IV Continuous <Continuous>    Genitourinary Medications    Hematologic/Oncologic Medications  aspirin  chewable 81 milliGRAM(s) Oral at bedtime    Antimicrobial/Immunologic Medications  ceFAZolin   IVPB 2000 milliGRAM(s) IV Intermittent every 8 hours    Endocrine/Metabolic Medications  atorvastatin 20 milliGRAM(s) Oral at bedtime  dextrose 50% Injectable 25 Gram(s) IV Push once  dextrose 50% Injectable 25 Gram(s) IV Push once  dextrose 50% Injectable 12.5 Gram(s) IV Push once  dextrose Oral Gel 15 Gram(s) Oral once PRN Blood Glucose LESS THAN 70 milliGRAM(s)/deciliter  glucagon  Injectable 1 milliGRAM(s) IntraMuscular once  insulin lispro (ADMELOG) corrective regimen sliding scale   SubCutaneous Before meals and at bedtime    Topical/Other Medications    --------------------------------------------------------------------------------------    VITAL SIGNS, INS/OUTS (last 24 hours):  --------------------------------------------------------------------------------------  ICU Vital Signs Last 24 Hrs  T(C): 35.9 (2024 07:22), Max: 35.9 (2024 07:22)  HR: 57 (2024 07:22) (57 - 57)  BP: 156/76 (2024 07:22) (156/76 - 156/76)  RR: 16 (2024 07:22) (16 - 16)  SpO2: 98% (2024 07:22) (98% - 98%)      I&O's Summary    --------------------------------------------------------------------------------------    EXAM:  General: Resting comfortably in bed, NAD  Neuro: A&Ox3, Slight facial droop noted on R with tongue deviation to R on protrusion. No additional neuro deficits noted  HEENT: Neck supple. R neck incision noted with dressing CDI - no hematoma or swelling noted  Pulmonary: Nonlabored breathing, no respiratory distress or accessory muscle noted. Lungs clear to auscultation bilaterally  Cardiovascular: NSR  Abdomen: Soft, nondistended, nontender  Extremities WWP, B/L UE and LE strength noted to be 5/5  MAXIMUS: No joint swelling or erythema appreciated  Skin: Dry, no rashes  Psychiatric: Goal-oriented thought, normal affect    LABS  --------------------------------------------------------------------------------------  Labs:  CAPILLARY BLOOD GLUCOSE      POCT Blood Glucose.: 86 mg/dL (2024 07:34)  POCT Blood Glucose.: 63 mg/dL (2024 07:32)         SICU CONSULT    HPI:  Attending: GEMA Ojeda, 73y, Female  MRN:  [id number]  Occupation: Housewife    HPI:    74 y/o F with PMHx of HTN, HLD, CAD s/p stents x2, former smoker, and PSH of cervical spine, knee and ovarian surgeries, as well as known carotid stenosis, asymptomatic. She was recently seen at the  office, Dr KELLEY Matthews's office, for follow-up of the carotid arteries. An ultrasound was done and demonstrated worsening R sided stenosis. Carotid duplex showed R ICA >70% stenosis (278/86 cm/s). L ICA <50% stenosis.She also completed cardiac evaluation and she was informed it was benign. Denies any neurological symptoms. She has a strong family hx of stroke and is concerned about it. She is on statin and recently held ASA as she was instructed not to take it by Dr Matthews. She denies any neurological symptoms. She presents for elective R CEA.    SICU ADDENDUM:  Patient is a 73F with PMHx of HTN, HLD, CAD (s/p stents on ASA, s/p + nuclear stress test with patents stents on [facility]), known b/l carotid stenosis (R>70%) with no prior neurologic deficits in the past and PSHx of cervical spine surgery, as well as ovarian and knee surgeries who presented to [facility] for elective R CEA under local cervical block. Procedure uncomplicated with EBL 50 cc, . 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 Transferred to SICU post operatively for frequent neuro checks and HD monitoring with SBP goal between 110-160.        PAST MEDICAL & SURGICAL HISTORY:  CAD (coronary artery disease)  has 2 cardiac stents      Hypothyroidism      COPD, mild      GERD (gastroesophageal reflux disease)      Hypertension      Hypercholesterolemia      Anxiety      Arthropathy  right shoulder in       Arthropathy  left knee      Diabetes       delivery delivered  and       H/O ovarian cystectomy  left side 15 years ago      H/O cervical spine surgery  fusion in       Multiparity  b/l tubal ligation      H/O induced   D&C      Arthropathy  left knee surgery      Coronary artery disease  has h/o 2 cardiac stents      H/O shoulder surgery  RIGHT ROTATOR CUFF          Home Medications:  aspirin 81 mg oral capsule: 1 cap(s) orally once a day (2024 09:59)  atorvastatin 20 mg oral tablet: 1 tab(s) orally once a day (2024 09:59)  Bystolic 10 mg oral tablet: 1 tab(s) orally once a day (2024 09:59)  DilTIAZem (Eqv-Dilacor XR) 240 mg/24 hours oral capsule, extended release: 1 cap(s) orally once a day (2024 09:59)  folic acid 1 mg oral tablet: 1 tab(s) orally once a day (2024 10:00)  gabapentin 300 mg oral capsule: 1 cap(s) orally once a day (2024 10:00)  hydroCHLOROthiazide 12.5 mg oral capsule: 1 cap(s) orally once a day (2024 10:00)  levothyroxine 100 mcg (0.1 mg) oral capsule: 1 cap(s) orally once a day (2024 10:00)  losartan 50 mg oral tablet: 1 tab(s) orally once a day (2024 10:00)  metFORMIN 500 mg oral tablet: 1 tab(s) orally 2 times a day (2024 09:59)  omeprazole 20 mg oral delayed release capsule: 1 cap(s) orally once a day (2024 09:59)  sertraline 50 mg oral tablet: 1 tab(s) orally once a day (2024 09:59)  Trilipix 135 mg oral delayed release capsule: 1 cap(s) orally once a day (2024 09:59)  Trulicity Pen 3 mg/0.5 mL subcutaneous solution: 3 milligram(s) subcutaneously once a week (2024 09:59)    PAST MEDICAL & SURGICAL HISTORY:  CAD (coronary artery disease)  has 2 cardiac stents      Hypothyroidism      COPD, mild      GERD (gastroesophageal reflux disease)      Hypertension      Hypercholesterolemia      Anxiety      Arthropathy  right shoulder in       Arthropathy  left knee      Diabetes       delivery delivered  and       H/O ovarian cystectomy  left side 15 years ago      H/O cervical spine surgery  fusion in       Multiparity  b/l tubal ligation      H/O induced   D&C      Arthropathy  left knee surgery      Coronary artery disease  has h/o 2 cardiac stents      H/O shoulder surgery  RIGHT ROTATOR CUFF        Home Meds: Home Medications:  aspirin 81 mg oral capsule: 1 cap(s) orally once a day (2024 07:15)  atorvastatin 20 mg oral tablet: 1 tab(s) orally once a day (:15)  Bystolic 10 mg oral tablet: 1 tab(s) orally once a day (:15)  DilTIAZem (Eqv-Dilacor XR) 240 mg/24 hours oral capsule, extended release: 1 cap(s) orally once a day (:15)  folic acid 1 mg oral tablet: 1 tab(s) orally once a day (:15)  gabapentin 300 mg oral capsule: 1 cap(s) orally once a day (:15)  hydroCHLOROthiazide 12.5 mg oral capsule: 1 cap(s) orally once a day (:15)  levothyroxine 100 mcg (0.1 mg) oral capsule: 1 cap(s) orally once a day (:15)  losartan 50 mg oral tablet: 1 tab(s) orally once a day (:15)  metFORMIN 500 mg oral tablet: 1 tab(s) orally 2 times a day (:15)  omeprazole 20 mg oral delayed release capsule: 1 cap(s) orally once a day (:15)  sertraline 50 mg oral tablet: 1 tab(s) orally once a day (:15)  Trilipix 135 mg oral delayed release capsule: 1 cap(s) orally once a day (:15)  Trulicity Pen 3 mg/0.5 mL subcutaneous solution: 3 milligram(s) subcutaneously once a week (:15)    Allergies: Allergies    No Known Allergies    Intolerances      Soc:   Advanced Directives: Presumed Full Code     CURRENT MEDICATIONS:   --------------------------------------------------------------------------------------  Neurologic Medications  gabapentin 300 milliGRAM(s) Oral at bedtime    Respiratory Medications    Cardiovascular Medications  carvedilol 25 milliGRAM(s) Oral every 12 hours  diltiazem    milliGRAM(s) Oral at bedtime    Gastrointestinal Medications  dextrose 5%. 1000 milliLiter(s) IV Continuous <Continuous>  dextrose 5%. 1000 milliLiter(s) IV Continuous <Continuous>  pantoprazole    Tablet 40 milliGRAM(s) Oral before breakfast  sodium chloride 0.9%. 1000 milliLiter(s) IV Continuous <Continuous>    Genitourinary Medications    Hematologic/Oncologic Medications  aspirin  chewable 81 milliGRAM(s) Oral at bedtime    Antimicrobial/Immunologic Medications  ceFAZolin   IVPB 2000 milliGRAM(s) IV Intermittent every 8 hours    Endocrine/Metabolic Medications  atorvastatin 20 milliGRAM(s) Oral at bedtime  dextrose 50% Injectable 25 Gram(s) IV Push once  dextrose 50% Injectable 25 Gram(s) IV Push once  dextrose 50% Injectable 12.5 Gram(s) IV Push once  dextrose Oral Gel 15 Gram(s) Oral once PRN Blood Glucose LESS THAN 70 milliGRAM(s)/deciliter  glucagon  Injectable 1 milliGRAM(s) IntraMuscular once  insulin lispro (ADMELOG) corrective regimen sliding scale   SubCutaneous Before meals and at bedtime    Topical/Other Medications    --------------------------------------------------------------------------------------    VITAL SIGNS, INS/OUTS (last 24 hours):  --------------------------------------------------------------------------------------  ICU Vital Signs Last 24 Hrs  T(C): 35.9 (2024 07:22), Max: 35.9 (2024 07:22)  HR: 57 (2024 07:22) (57 - 57)  BP: 156/76 (2024 07:22) (156/76 - 156/76)  RR: 16 (2024 07:22) (16 - 16)  SpO2: 98% (2024 07:22) (98% - 98%)      I&O's Summary    --------------------------------------------------------------------------------------    EXAM:  General: Resting comfortably in bed, NAD  Neuro: A&Ox3, Slight facial droop noted on R with tongue deviation to R on protrusion. Mild L facial droop also appreciated. No additional neuro deficits noted on complete exam.   HEENT: Neck supple. R neck incision noted with dressing CDI - no hematoma or swelling noted  Pulmonary: Nonlabored breathing, no respiratory distress or accessory muscle noted. Lungs clear to auscultation bilaterally  Cardiovascular: NSR  Abdomen: Soft, nondistended, nontender  Extremities WWP, B/L UE and LE strength noted to be 5/5  MAXIMUS: No joint swelling or erythema appreciated  Skin: Dry, no rashes  Psychiatric: Goal-oriented thought, normal affect    LABS  --------------------------------------------------------------------------------------  Labs:  CAPILLARY BLOOD GLUCOSE      POCT Blood Glucose.: 86 mg/dL (2024 07:34)  POCT Blood Glucose.: 63 mg/dL (2024 07:32)         SICU CONSULT    HPI:  Attending: GEMA Ojeda, 73y, Female  MRN:  [id number]  Occupation: Housewife    HPI:    72 y/o F with PMHx of HTN, HLD, CAD s/p stents x2, former smoker, and PSH of cervical spine, knee and ovarian surgeries, as well as known carotid stenosis, asymptomatic. She was recently seen at the  office, Dr KELLEY Matthews's office, for follow-up of the carotid arteries. An ultrasound was done and demonstrated worsening R sided stenosis. Carotid duplex showed R ICA >70% stenosis (278/86 cm/s). L ICA <50% stenosis.She also completed cardiac evaluation and she was informed it was benign. Denies any neurological symptoms. She has a strong family hx of stroke and is concerned about it. She is on statin and recently held ASA as she was instructed not to take it by Dr Matthews. She denies any neurological symptoms. She presents for elective R CEA.    SICU ADDENDUM:  Patient is a 73F with PMHx of HTN, HLD, CAD (s/p stents on ASA, s/p + nuclear stress test with patents stents on [facility]), known b/l carotid stenosis (R>70%) with no prior neurologic deficits in the past and PSHx of cervical spine surgery, as well as ovarian and knee surgeries who presented to [facility] for elective R CEA under local cervical block. Procedure uncomplicated with EBL 50 cc, . 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 Transferred to SICU post operatively for frequent neuro checks and HD monitoring with SBP goal between 110-160.        PAST MEDICAL & SURGICAL HISTORY:  CAD (coronary artery disease)  has 2 cardiac stents      Hypothyroidism      COPD, mild      GERD (gastroesophageal reflux disease)      Hypertension      Hypercholesterolemia      Anxiety      Arthropathy  right shoulder in       Arthropathy  left knee      Diabetes       delivery delivered  and       H/O ovarian cystectomy  left side 15 years ago      H/O cervical spine surgery  fusion in       Multiparity  b/l tubal ligation      H/O induced   D&C      Arthropathy  left knee surgery      Coronary artery disease  has h/o 2 cardiac stents      H/O shoulder surgery  RIGHT ROTATOR CUFF          Home Medications:  aspirin 81 mg oral capsule: 1 cap(s) orally once a day (2024 09:59)  atorvastatin 20 mg oral tablet: 1 tab(s) orally once a day (2024 09:59)  Bystolic 10 mg oral tablet: 1 tab(s) orally once a day (2024 09:59)  DilTIAZem (Eqv-Dilacor XR) 240 mg/24 hours oral capsule, extended release: 1 cap(s) orally once a day (2024 09:59)  folic acid 1 mg oral tablet: 1 tab(s) orally once a day (2024 10:00)  gabapentin 300 mg oral capsule: 1 cap(s) orally once a day (2024 10:00)  hydroCHLOROthiazide 12.5 mg oral capsule: 1 cap(s) orally once a day (2024 10:00)  levothyroxine 100 mcg (0.1 mg) oral capsule: 1 cap(s) orally once a day (2024 10:00)  losartan 50 mg oral tablet: 1 tab(s) orally once a day (2024 10:00)  metFORMIN 500 mg oral tablet: 1 tab(s) orally 2 times a day (2024 09:59)  omeprazole 20 mg oral delayed release capsule: 1 cap(s) orally once a day (2024 09:59)  sertraline 50 mg oral tablet: 1 tab(s) orally once a day (2024 09:59)  Trilipix 135 mg oral delayed release capsule: 1 cap(s) orally once a day (2024 09:59)  Trulicity Pen 3 mg/0.5 mL subcutaneous solution: 3 milligram(s) subcutaneously once a week (2024 09:59)    PAST MEDICAL & SURGICAL HISTORY:  CAD (coronary artery disease)  has 2 cardiac stents      Hypothyroidism      COPD, mild      GERD (gastroesophageal reflux disease)      Hypertension      Hypercholesterolemia      Anxiety      Arthropathy  right shoulder in       Arthropathy  left knee      Diabetes       delivery delivered  and       H/O ovarian cystectomy  left side 15 years ago      H/O cervical spine surgery  fusion in       Multiparity  b/l tubal ligation      H/O induced   D&C      Arthropathy  left knee surgery      Coronary artery disease  has h/o 2 cardiac stents      H/O shoulder surgery  RIGHT ROTATOR CUFF        Home Meds: Home Medications:  aspirin 81 mg oral capsule: 1 cap(s) orally once a day (2024 07:15)  atorvastatin 20 mg oral tablet: 1 tab(s) orally once a day (:15)  Bystolic 10 mg oral tablet: 1 tab(s) orally once a day (:15)  DilTIAZem (Eqv-Dilacor XR) 240 mg/24 hours oral capsule, extended release: 1 cap(s) orally once a day (:15)  folic acid 1 mg oral tablet: 1 tab(s) orally once a day (:15)  gabapentin 300 mg oral capsule: 1 cap(s) orally once a day (:15)  hydroCHLOROthiazide 12.5 mg oral capsule: 1 cap(s) orally once a day (:15)  levothyroxine 100 mcg (0.1 mg) oral capsule: 1 cap(s) orally once a day (:15)  losartan 50 mg oral tablet: 1 tab(s) orally once a day (:15)  metFORMIN 500 mg oral tablet: 1 tab(s) orally 2 times a day (:15)  omeprazole 20 mg oral delayed release capsule: 1 cap(s) orally once a day (:15)  sertraline 50 mg oral tablet: 1 tab(s) orally once a day (:15)  Trilipix 135 mg oral delayed release capsule: 1 cap(s) orally once a day (:15)  Trulicity Pen 3 mg/0.5 mL subcutaneous solution: 3 milligram(s) subcutaneously once a week (:15)    Allergies: Allergies    No Known Allergies    Intolerances      Soc:   Advanced Directives: Presumed Full Code     CURRENT MEDICATIONS:   --------------------------------------------------------------------------------------  Neurologic Medications  gabapentin 300 milliGRAM(s) Oral at bedtime    Respiratory Medications    Cardiovascular Medications  carvedilol 25 milliGRAM(s) Oral every 12 hours  diltiazem    milliGRAM(s) Oral at bedtime    Gastrointestinal Medications  dextrose 5%. 1000 milliLiter(s) IV Continuous <Continuous>  dextrose 5%. 1000 milliLiter(s) IV Continuous <Continuous>  pantoprazole    Tablet 40 milliGRAM(s) Oral before breakfast  sodium chloride 0.9%. 1000 milliLiter(s) IV Continuous <Continuous>    Genitourinary Medications    Hematologic/Oncologic Medications  aspirin  chewable 81 milliGRAM(s) Oral at bedtime    Antimicrobial/Immunologic Medications  ceFAZolin   IVPB 2000 milliGRAM(s) IV Intermittent every 8 hours    Endocrine/Metabolic Medications  atorvastatin 20 milliGRAM(s) Oral at bedtime  dextrose 50% Injectable 25 Gram(s) IV Push once  dextrose 50% Injectable 25 Gram(s) IV Push once  dextrose 50% Injectable 12.5 Gram(s) IV Push once  dextrose Oral Gel 15 Gram(s) Oral once PRN Blood Glucose LESS THAN 70 milliGRAM(s)/deciliter  glucagon  Injectable 1 milliGRAM(s) IntraMuscular once  insulin lispro (ADMELOG) corrective regimen sliding scale   SubCutaneous Before meals and at bedtime    Topical/Other Medications    --------------------------------------------------------------------------------------    VITAL SIGNS, INS/OUTS (last 24 hours):  --------------------------------------------------------------------------------------  ICU Vital Signs Last 24 Hrs  T(C): 35.9 (2024 07:22), Max: 35.9 (2024 07:22)  HR: 57 (2024 07:22) (57 - 57)  BP: 156/76 (2024 07:22) (156/76 - 156/76)  RR: 16 (2024 07:22) (16 - 16)  SpO2: 98% (2024 07:22) (98% - 98%)      I&O's Summary    --------------------------------------------------------------------------------------    EXAM:  General: Resting comfortably in bed, NAD  Neuro: A&Ox3, Slight facial droop noted on R with tongue deviation to R on protrusion. Mild L facial droop also appreciated. No additional neuro deficits noted on complete exam.   HEENT: Neck supple. R neck incision noted with dressing CDI - no hematoma or swelling noted  Pulmonary: Nonlabored breathing, no respiratory distress or accessory muscle noted. Lungs clear to auscultation bilaterally  Cardiovascular: NSR  Abdomen: Soft, nondistended, nontender  Extremities WWP, B/L UE and LE strength noted to be 5/5  MAXIMUS: No joint swelling or erythema appreciated  Skin: Dry, no rashes  Psychiatric: Goal-oriented thought, normal affect    LABS  --------------------------------------------------------------------------------------  Labs:  CAPILLARY BLOOD GLUCOSE      POCT Blood Glucose.: 86 mg/dL (2024 07:34)  POCT Blood Glucose.: 63 mg/dL (2024 07:32)

## 2024-01-08 NOTE — PROGRESS NOTE ADULT - SUBJECTIVE AND OBJECTIVE BOX
Vascular Surgery Post-Op Note    Procedure: R CEA  Date: 1/8/2024  Surgeon: Dr. Elias     S:     Pt has no complaints. Denies  Headache, blurred vision, dizziness, CP, SOB,     O:  T(C): 36.3 (01-08-24 @ 12:08), Max: 36.3 (01-08-24 @ 12:08)  T(F): 97.4 (01-08-24 @ 12:08), Max: 97.4 (01-08-24 @ 12:08)  HR: 55 (01-08-24 @ 14:00) (53 - 65)  BP: --  RR: 32 (01-08-24 @ 14:00) (15 - 33)  SpO2: 98% (01-08-24 @ 14:00) (93% - 100%)  Wt(kg): --                        9.5    5.14  )-----------( 199      ( 08 Jan 2024 11:39 )             28.9     01-08    137  |  105  |  15  ----------------------------<  98  4.2   |  26  |  0.75    Ca    8.6      08 Jan 2024 11:39  Phos  3.1     01-08  Mg     1.8     01-08        Gen: NAD, resting comfortably in bed  Neuro:   CN 2-12 grossly intact   neck incision c/d/i   C/V: NSR  Pulm: Nonlabored breathing, no respiratory distress  Abd: soft, NT/ND  Extrem: WWP,      A/P: 73yFemale s/p above procedure  - Neuro checks q15 for 1 hr, q30 for 2 hr, q1hr for 5 hrs  - Monitor for headaches or neck hematoma  - SBP goals 110-160  - Diet: NPO, may switch to CLD 4 hours post-op  - IVF at 75   - 2 doses man-op Ancef   - c/w ASA/Statin  - Pain/nausea control          ****please copy and paste into discharge *****      Call 911 if you or someone you know experiences the following symptoms of stroke (can be remembered by BE FAST):  •Balance: Dizziness, loss of balance, or a sense of falling  •Eyes: Sudden double vision or blurred vision  •Face: drooping of one side of the face  •Arm: arm weakness  •Speech:  Sudden trouble talking or slurred speech, trouble understanding others  •Time: Time to call for an ambulance fast!

## 2024-01-08 NOTE — CONSULT NOTE ADULT - ASSESSMENT
ASSESSMENT/PLAN:  72 y/o F with PMHx of HTN, HLD, hypothyroid, CAD s/p stents x2 (2019), former smoker, and PSH of cervical spine ACDF, c sections x 2,  L knee surgery, rotator cuff surgery 10/2021, and ovarian cyst drainage, as well as asymptomatic known carotid stenosis, outpt duplex w/ worsened R ICA stenosis, here for elective R CEA. Now s/p R CEA (1/8). Transferred to SICU post operatively for frequent neuro checks and HD monitoring.    NEURO: neuro checks q1h, avoid narcotics. Tylenol PRN for pain. Hx of depression - continue Sertraline,  HEENT: Hx of hypothyroidism - Continue Levothyroxine.   CV: Goal SBP goal 110-160. Hx of HTN - holding Losartan, HCTZ. Hx of HLD - contine atrovastatin. Hx of CAD - continue Bystolic, Diltiazem, ASA.  PULM: Goal maintain saturation > 94%. No pulmonary concerns  GI/FEN: NPO post procedure. CLD this afternoon likely. LR@70  : Voids  ENDO: mISS. Hx of DMII - holding metformin.  ID: Ancef perioperatively x3  PPX: SCDs   LINES: PIVs,   WOUNDS/DRAINS: R neck   PT/OT: Bedrest for now  Dispo: SICU

## 2024-01-08 NOTE — H&P ADULT - NSHPPOADEEPVENOUSTHROMB_GEN_A_CORE
HPI Comments: 14 yo F with no significant past medical history presenting for evaluation of fever and cough. Symptoms have been present for the last two days with Tm 102. Complains of chest pain and myalgias. Associated rhinorrhea, congestion and sore throat with coughing. No vomiting, diarrhea or abdominal pain. No difficulty breathing. Last dose of antipyretic was last night.  + sick contacts but none \"as sick as (her)\". PMH: noncontributory  SurgH: none  FH: noncontributory  ALL: NKDA  IMM: UTD but did not receive the flu vaccine    Patient is a 14 y.o. female presenting with fever and cough. The history is provided by the mother and the patient. Pediatric Social History:      Chief complaint is cough, congestion, no diarrhea, sore throat, no vomiting, no ear pain, no eye redness, no seizures and no shortness of breath. Associated symptoms include a fever, congestion, rhinorrhea, sore throat and cough. Pertinent negatives include no photophobia, no abdominal pain, no constipation, no diarrhea, no nausea, no vomiting, no ear discharge, no ear pain, no headaches, no stridor, no wheezing, no rash and no eye redness. Cough   Associated symptoms include chest pain, chills, rhinorrhea, sore throat and myalgias. Pertinent negatives include no eye redness, no ear pain, no headaches, no shortness of breath, no wheezing, no nausea, no vomiting and no confusion. Past Medical History:   Diagnosis Date    Dental caries        History reviewed. No pertinent past surgical history. History reviewed. No pertinent family history. Social History     Social History    Marital status: SINGLE     Spouse name: N/A    Number of children: N/A    Years of education: N/A     Occupational History    Not on file.      Social History Main Topics    Smoking status: Never Smoker    Smokeless tobacco: Not on file    Alcohol use Not on file    Drug use: Not on file    Sexual activity: Not on file     Other Topics Concern    Not on file     Social History Narrative    ** Merged History Encounter **              ALLERGIES: Review of patient's allergies indicates no known allergies. Review of Systems   Constitutional: Positive for activity change, chills, fatigue and fever. Negative for appetite change and irritability. HENT: Positive for congestion, rhinorrhea and sore throat. Negative for ear discharge, ear pain, sinus pressure and tinnitus. Eyes: Negative for photophobia, redness and visual disturbance. Respiratory: Positive for cough and chest tightness. Negative for shortness of breath, wheezing and stridor. Cardiovascular: Positive for chest pain. Gastrointestinal: Negative for abdominal pain, constipation, diarrhea, nausea and vomiting. Genitourinary: Negative for decreased urine volume. Musculoskeletal: Positive for myalgias. Negative for gait problem. Skin: Negative for pallor, rash and wound. Neurological: Negative for dizziness, seizures, light-headedness, numbness and headaches. Hematological: Does not bruise/bleed easily. Psychiatric/Behavioral: Negative for confusion. All other systems reviewed and are negative. Vitals:    01/04/17 0607 01/04/17 0610   BP:  120/79   Pulse:  98   Resp:  22   Temp:  (!) 101.6 °F (38.7 °C)   SpO2:  98%   Weight: 53.2 kg             Physical Exam   Constitutional: She appears well-developed and well-nourished. She is active. No distress. HENT:   Head: Atraumatic. No signs of injury. Right Ear: Tympanic membrane normal.   Left Ear: Tympanic membrane normal.   Nose: Nasal discharge present. Mouth/Throat: Mucous membranes are moist. Dentition is normal. No tonsillar exudate. Oropharynx is clear. Pharynx is normal.   Eyes: Conjunctivae and EOM are normal. Pupils are equal, round, and reactive to light. Right eye exhibits no discharge. Left eye exhibits no discharge. Neck: Normal range of motion. Neck supple.  No rigidity or adenopathy. Cardiovascular: Normal rate, regular rhythm, S1 normal and S2 normal.  Pulses are strong. No murmur heard. Pulmonary/Chest: Effort normal and breath sounds normal. There is normal air entry. No stridor. No respiratory distress. Air movement is not decreased. She has no wheezes. She has no rhonchi. She has no rales. She exhibits no retraction. Abdominal: Soft. Bowel sounds are normal. She exhibits no distension. There is no tenderness. There is no rebound and no guarding. Musculoskeletal: Normal range of motion. She exhibits no edema, tenderness or deformity. Neurological: She is alert. She exhibits normal muscle tone. Skin: Skin is warm. Capillary refill takes less than 3 seconds. No purpura and no rash noted. She is not diaphoretic. No jaundice or pallor. Nursing note and vitals reviewed. MDM  Number of Diagnoses or Management Options  Fever in pediatric patient:   Viral illness:   Diagnosis management comments: 14 yo F with history and physical exam consistent with a flu-like illness. No evidence of bacterial infection on examination. Appears well hydrated. Will give motrin and obtain flu swab. Clear lungs, normal RR and oxygen saturation - defer CXR at this time. Flu negative. Patient feels much better after motrin. Reasons for seeking further medical attention were reviewed.   Follow-up with PMD.           Amount and/or Complexity of Data Reviewed  Clinical lab tests: ordered and reviewed  Decide to obtain previous medical records or to obtain history from someone other than the patient: yes  Obtain history from someone other than the patient: yes  Review and summarize past medical records: yes    Risk of Complications, Morbidity, and/or Mortality  Presenting problems: moderate  Diagnostic procedures: moderate  Management options: moderate    Patient Progress  Patient progress: improved    ED Course       Procedures no

## 2024-01-09 ENCOUNTER — TRANSCRIPTION ENCOUNTER (OUTPATIENT)
Age: 74
End: 2024-01-09

## 2024-01-09 VITALS
RESPIRATION RATE: 20 BRPM | OXYGEN SATURATION: 97 % | TEMPERATURE: 97 F | DIASTOLIC BLOOD PRESSURE: 56 MMHG | HEART RATE: 60 BPM | SYSTOLIC BLOOD PRESSURE: 110 MMHG

## 2024-01-09 LAB
A1C WITH ESTIMATED AVERAGE GLUCOSE RESULT: 5.6 % — SIGNIFICANT CHANGE UP (ref 4–5.6)
A1C WITH ESTIMATED AVERAGE GLUCOSE RESULT: 5.6 % — SIGNIFICANT CHANGE UP (ref 4–5.6)
ANION GAP SERPL CALC-SCNC: 8 MMOL/L — SIGNIFICANT CHANGE UP (ref 5–17)
ANION GAP SERPL CALC-SCNC: 8 MMOL/L — SIGNIFICANT CHANGE UP (ref 5–17)
BUN SERPL-MCNC: 15 MG/DL — SIGNIFICANT CHANGE UP (ref 7–23)
BUN SERPL-MCNC: 15 MG/DL — SIGNIFICANT CHANGE UP (ref 7–23)
CALCIUM SERPL-MCNC: 8.5 MG/DL — SIGNIFICANT CHANGE UP (ref 8.4–10.5)
CALCIUM SERPL-MCNC: 8.5 MG/DL — SIGNIFICANT CHANGE UP (ref 8.4–10.5)
CHLORIDE SERPL-SCNC: 105 MMOL/L — SIGNIFICANT CHANGE UP (ref 96–108)
CHLORIDE SERPL-SCNC: 105 MMOL/L — SIGNIFICANT CHANGE UP (ref 96–108)
CO2 SERPL-SCNC: 26 MMOL/L — SIGNIFICANT CHANGE UP (ref 22–31)
CO2 SERPL-SCNC: 26 MMOL/L — SIGNIFICANT CHANGE UP (ref 22–31)
CREAT SERPL-MCNC: 0.76 MG/DL — SIGNIFICANT CHANGE UP (ref 0.5–1.3)
CREAT SERPL-MCNC: 0.76 MG/DL — SIGNIFICANT CHANGE UP (ref 0.5–1.3)
EGFR: 82 ML/MIN/1.73M2 — SIGNIFICANT CHANGE UP
EGFR: 82 ML/MIN/1.73M2 — SIGNIFICANT CHANGE UP
ESTIMATED AVERAGE GLUCOSE: 114 MG/DL — SIGNIFICANT CHANGE UP (ref 68–114)
ESTIMATED AVERAGE GLUCOSE: 114 MG/DL — SIGNIFICANT CHANGE UP (ref 68–114)
GLUCOSE BLDC GLUCOMTR-MCNC: 95 MG/DL — SIGNIFICANT CHANGE UP (ref 70–99)
GLUCOSE BLDC GLUCOMTR-MCNC: 95 MG/DL — SIGNIFICANT CHANGE UP (ref 70–99)
GLUCOSE SERPL-MCNC: 104 MG/DL — HIGH (ref 70–99)
GLUCOSE SERPL-MCNC: 104 MG/DL — HIGH (ref 70–99)
HCT VFR BLD CALC: 27.8 % — LOW (ref 34.5–45)
HCT VFR BLD CALC: 27.8 % — LOW (ref 34.5–45)
HCV AB S/CO SERPL IA: 0.04 S/CO — SIGNIFICANT CHANGE UP
HCV AB S/CO SERPL IA: 0.04 S/CO — SIGNIFICANT CHANGE UP
HCV AB SERPL-IMP: SIGNIFICANT CHANGE UP
HCV AB SERPL-IMP: SIGNIFICANT CHANGE UP
HGB BLD-MCNC: 9 G/DL — LOW (ref 11.5–15.5)
HGB BLD-MCNC: 9 G/DL — LOW (ref 11.5–15.5)
MAGNESIUM SERPL-MCNC: 2 MG/DL — SIGNIFICANT CHANGE UP (ref 1.6–2.6)
MAGNESIUM SERPL-MCNC: 2 MG/DL — SIGNIFICANT CHANGE UP (ref 1.6–2.6)
MCHC RBC-ENTMCNC: 26.7 PG — LOW (ref 27–34)
MCHC RBC-ENTMCNC: 26.7 PG — LOW (ref 27–34)
MCHC RBC-ENTMCNC: 32.4 GM/DL — SIGNIFICANT CHANGE UP (ref 32–36)
MCHC RBC-ENTMCNC: 32.4 GM/DL — SIGNIFICANT CHANGE UP (ref 32–36)
MCV RBC AUTO: 82.5 FL — SIGNIFICANT CHANGE UP (ref 80–100)
MCV RBC AUTO: 82.5 FL — SIGNIFICANT CHANGE UP (ref 80–100)
NRBC # BLD: 0 /100 WBCS — SIGNIFICANT CHANGE UP (ref 0–0)
NRBC # BLD: 0 /100 WBCS — SIGNIFICANT CHANGE UP (ref 0–0)
PHOSPHATE SERPL-MCNC: 3.6 MG/DL — SIGNIFICANT CHANGE UP (ref 2.5–4.5)
PHOSPHATE SERPL-MCNC: 3.6 MG/DL — SIGNIFICANT CHANGE UP (ref 2.5–4.5)
PLATELET # BLD AUTO: 194 K/UL — SIGNIFICANT CHANGE UP (ref 150–400)
PLATELET # BLD AUTO: 194 K/UL — SIGNIFICANT CHANGE UP (ref 150–400)
POTASSIUM SERPL-MCNC: 4.2 MMOL/L — SIGNIFICANT CHANGE UP (ref 3.5–5.3)
POTASSIUM SERPL-MCNC: 4.2 MMOL/L — SIGNIFICANT CHANGE UP (ref 3.5–5.3)
POTASSIUM SERPL-SCNC: 4.2 MMOL/L — SIGNIFICANT CHANGE UP (ref 3.5–5.3)
POTASSIUM SERPL-SCNC: 4.2 MMOL/L — SIGNIFICANT CHANGE UP (ref 3.5–5.3)
RBC # BLD: 3.37 M/UL — LOW (ref 3.8–5.2)
RBC # BLD: 3.37 M/UL — LOW (ref 3.8–5.2)
RBC # FLD: 13.3 % — SIGNIFICANT CHANGE UP (ref 10.3–14.5)
RBC # FLD: 13.3 % — SIGNIFICANT CHANGE UP (ref 10.3–14.5)
SODIUM SERPL-SCNC: 139 MMOL/L — SIGNIFICANT CHANGE UP (ref 135–145)
SODIUM SERPL-SCNC: 139 MMOL/L — SIGNIFICANT CHANGE UP (ref 135–145)
WBC # BLD: 5.28 K/UL — SIGNIFICANT CHANGE UP (ref 3.8–10.5)
WBC # BLD: 5.28 K/UL — SIGNIFICANT CHANGE UP (ref 3.8–10.5)
WBC # FLD AUTO: 5.28 K/UL — SIGNIFICANT CHANGE UP (ref 3.8–10.5)
WBC # FLD AUTO: 5.28 K/UL — SIGNIFICANT CHANGE UP (ref 3.8–10.5)

## 2024-01-09 PROCEDURE — 84100 ASSAY OF PHOSPHORUS: CPT

## 2024-01-09 PROCEDURE — 85347 COAGULATION TIME ACTIVATED: CPT

## 2024-01-09 PROCEDURE — 88304 TISSUE EXAM BY PATHOLOGIST: CPT

## 2024-01-09 PROCEDURE — 83735 ASSAY OF MAGNESIUM: CPT

## 2024-01-09 PROCEDURE — 83036 HEMOGLOBIN GLYCOSYLATED A1C: CPT

## 2024-01-09 PROCEDURE — 86850 RBC ANTIBODY SCREEN: CPT

## 2024-01-09 PROCEDURE — 36415 COLL VENOUS BLD VENIPUNCTURE: CPT

## 2024-01-09 PROCEDURE — 85610 PROTHROMBIN TIME: CPT

## 2024-01-09 PROCEDURE — 85027 COMPLETE CBC AUTOMATED: CPT

## 2024-01-09 PROCEDURE — 86901 BLOOD TYPING SEROLOGIC RH(D): CPT

## 2024-01-09 PROCEDURE — 86900 BLOOD TYPING SEROLOGIC ABO: CPT

## 2024-01-09 PROCEDURE — C1889: CPT

## 2024-01-09 PROCEDURE — 80048 BASIC METABOLIC PNL TOTAL CA: CPT

## 2024-01-09 PROCEDURE — 88311 DECALCIFY TISSUE: CPT

## 2024-01-09 PROCEDURE — 85730 THROMBOPLASTIN TIME PARTIAL: CPT

## 2024-01-09 PROCEDURE — 82962 GLUCOSE BLOOD TEST: CPT

## 2024-01-09 PROCEDURE — 86803 HEPATITIS C AB TEST: CPT

## 2024-01-09 RX ORDER — LOSARTAN POTASSIUM 100 MG/1
50 TABLET, FILM COATED ORAL DAILY
Refills: 0 | Status: DISCONTINUED | OUTPATIENT
Start: 2024-01-09 | End: 2024-01-09

## 2024-01-09 RX ORDER — HYDROCHLOROTHIAZIDE 25 MG
1 TABLET ORAL
Refills: 0 | DISCHARGE

## 2024-01-09 RX ORDER — HYDROCHLOROTHIAZIDE 25 MG
1 TABLET ORAL
Qty: 0 | Refills: 0 | DISCHARGE

## 2024-01-09 RX ORDER — ACETAMINOPHEN 500 MG
2 TABLET ORAL
Qty: 0 | Refills: 0 | DISCHARGE
Start: 2024-01-09

## 2024-01-09 RX ADMIN — PANTOPRAZOLE SODIUM 40 MILLIGRAM(S): 20 TABLET, DELAYED RELEASE ORAL at 06:32

## 2024-01-09 RX ADMIN — CARVEDILOL PHOSPHATE 25 MILLIGRAM(S): 80 CAPSULE, EXTENDED RELEASE ORAL at 06:32

## 2024-01-09 RX ADMIN — Medication 100 MICROGRAM(S): at 06:32

## 2024-01-09 RX ADMIN — Medication 1000 MILLIGRAM(S): at 05:20

## 2024-01-09 RX ADMIN — Medication 100 MILLIGRAM(S): at 01:03

## 2024-01-09 NOTE — DISCHARGE NOTE NURSING/CASE MANAGEMENT/SOCIAL WORK - PATIENT PORTAL LINK FT
You can access the FollowMyHealth Patient Portal offered by [facility] by registering at the following website: [email or web address]. By joining [facility]’s FollowMyHealth portal, you will also be able to view your health information using other applications (apps) compatible with our system. You can access the FollowMyHealth Patient Portal offered by [facility] by registering at the following website: [email or web address]. By joining [facility]’s FollowMyHealth portal, you will also be able to view your health information using other applications (apps) compatible with our system.

## 2024-01-09 NOTE — DISCHARGE NOTE PROVIDER - NSDCMRMEDTOKEN_GEN_ALL_CORE_FT
acetaminophen 500 mg oral tablet: 2 tab(s) orally every 6 hours As needed Moderate Pain (4 - 6)  aspirin 81 mg oral capsule: 1 cap(s) orally once a day  atorvastatin 20 mg oral tablet: 1 tab(s) orally once a day  Bystolic 10 mg oral tablet: 1 tab(s) orally once a day  DilTIAZem (Eqv-Dilacor XR) 240 mg/24 hours oral capsule, extended release: 1 cap(s) orally once a day  folic acid 1 mg oral tablet: 1 tab(s) orally once a day  gabapentin 300 mg oral capsule: 1 cap(s) orally once a day  levothyroxine 100 mcg (0.1 mg) oral capsule: 1 cap(s) orally once a day  losartan 50 mg oral tablet: 1 tab(s) orally once a day  metFORMIN 500 mg oral tablet: 1 tab(s) orally 2 times a day  omeprazole 20 mg oral delayed release capsule: 1 cap(s) orally once a day  sertraline 50 mg oral tablet: 1 tab(s) orally once a day  Trilipix 135 mg oral delayed release capsule: 1 cap(s) orally once a day  Trulicity Pen 3 mg/0.5 mL subcutaneous solution: 3 milligram(s) subcutaneously once a week   acetaminophen 500 mg oral tablet: 2 tab(s) orally every 6 hours As needed Moderate Pain (4 - 6)  aspirin 81 mg oral capsule: 1 cap(s) orally once a day  atorvastatin 20 mg oral tablet: 1 tab(s) orally once a day  Bystolic 10 mg oral tablet: 1 tab(s) orally once a day  DilTIAZem (Eqv-Dilacor XR) 240 mg/24 hours oral capsule, extended release: 1 cap(s) orally once a day  folic acid 1 mg oral tablet: 1 tab(s) orally once a day  gabapentin 300 mg oral capsule: 1 cap(s) orally once a day  hydroCHLOROthiazide 12.5 mg oral capsule: 1 cap(s) orally once a day  levothyroxine 100 mcg (0.1 mg) oral capsule: 1 cap(s) orally once a day  losartan 50 mg oral tablet: 1 tab(s) orally once a day  metFORMIN 500 mg oral tablet: 1 tab(s) orally 2 times a day  omeprazole 20 mg oral delayed release capsule: 1 cap(s) orally once a day  sertraline 50 mg oral tablet: 1 tab(s) orally once a day  Trilipix 135 mg oral delayed release capsule: 1 cap(s) orally once a day  Trulicity Pen 3 mg/0.5 mL subcutaneous solution: 3 milligram(s) subcutaneously once a week

## 2024-01-09 NOTE — DISCHARGE NOTE PROVIDER - NSDCFUADDINST_GEN_ALL_CORE_FT
FOLLOW UP: Dr. Elias in 1 week. Your appointment has been made for 1/12/24 at 9am. Call the office at  with any questions.  WOUND CARE: You may shower; soap and water over incision sites. Do not scrub. Pat dry when done.   ACTIVITY: Ambulate as tolerated, but no heavy lifting (>10lbs) or strenuous exercise. NO sharp neck turns. NO driving until you see surgeon in office.  If you have a persistent headache that is not improved with Tylenol, please call MD.  DIET: You may resume regular diet.   Call the office if you experience increasing pain, redness, swelling or drainage from incision sites/wounds, or temperature >101.4F.   NEW MEDICATIONS: none. Resume all home medications  DISCHARGE DESTINATION: Home  Discharge Education provided: Yes    Call 911 if you or someone you know experiences the following symptoms of stroke (can be remembered by BE FAST):  •Balance: Dizziness, loss of balance, or a sense of falling  •Eyes: Sudden double vision or blurred vision  •Face: drooping of one side of the face  •Arm: arm weakness  •Speech:  Sudden trouble talking or slurred speech, trouble understanding others  •Time: Time to call for an ambulance fast!

## 2024-01-09 NOTE — PROGRESS NOTE ADULT - SUBJECTIVE AND OBJECTIVE BOX
Interval Events:  No events overnight   Patient seen and examined at bedside.      Allergies    No Known Allergies    Intolerances        Vital Signs Last 24 Hrs  T(C): 36.5 (09 Jan 2024 05:30), Max: 36.7 (08 Jan 2024 21:28)  T(F): 97.7 (09 Jan 2024 05:30), Max: 98 (08 Jan 2024 21:28)  HR: 60 (09 Jan 2024 07:00) (53 - 65)  BP: 135/86 (09 Jan 2024 07:00) (135/86 - 162/79)  BP(mean): 106 (09 Jan 2024 07:00) (102 - 114)  RR: 20 (09 Jan 2024 07:00) (15 - 34)  SpO2: 97% (09 Jan 2024 07:00) (93% - 100%)    Parameters below as of 09 Jan 2024 08:00  Patient On (Oxygen Delivery Method): room air        01-08 @ 07:01  -  01-09 @ 07:00  --------------------------------------------------------  IN: 750 mL / OUT: 925 mL / NET: -175 mL      01-08 @ 07:01  -  01-09 @ 07:00  --------------------------------------------------------  IN: 750 mL / OUT: 925 mL / NET: -175 mL        Physical Exam:     General: Resting comfortably in bed, NAD  Neuro: A&Ox3, Slight facial droop noted on R with tongue deviation to R on protrusion. Mild L facial droop also appreciated. No additional neuro deficits noted on complete exam.   HEENT: Neck supple. R neck incision noted with dressing CDI - no hematoma or swelling noted  Pulmonary: Nonlabored breathing, no respiratory distress or accessory muscle noted. Lungs clear to auscultation bilaterally  Cardiovascular: NSR  Abdomen: Soft, nondistended, nontender  Extremities WWP, B/L UE and LE strength noted to be 5/5  MAXIMUS: No joint swelling or erythema appreciated  Skin: Dry, no rashes  Psychiatric: Goal-oriented thought, normal affect      LABS:      CBC Full  -  ( 09 Jan 2024 03:01 )  WBC Count : 5.28 K/uL  RBC Count : 3.37 M/uL  Hemoglobin : 9.0 g/dL  Hematocrit : 27.8 %  Platelet Count - Automated : 194 K/uL  Mean Cell Volume : 82.5 fl  Mean Cell Hemoglobin : 26.7 pg  Mean Cell Hemoglobin Concentration : 32.4 gm/dL  Auto Neutrophil # : x  Auto Lymphocyte # : x  Auto Monocyte # : x  Auto Eosinophil # : x  Auto Basophil # : x  Auto Neutrophil % : x  Auto Lymphocyte % : x  Auto Monocyte % : x  Auto Eosinophil % : x  Auto Basophil % : x    01-09    139  |  105  |  15  ----------------------------<  104<H>  4.2   |  26  |  0.76    Ca    8.5      09 Jan 2024 03:01  Phos  3.6     01-09  Mg     2.0     01-09      PT/INR - ( 08 Jan 2024 11:39 )   PT: 12.7 sec;   INR: 1.12          PTT - ( 08 Jan 2024 11:39 )  PTT:26.4 sec      Urinalysis Basic - ( 09 Jan 2024 03:01 )    Color: x / Appearance: x / SG: x / pH: x  Gluc: 104 mg/dL / Ketone: x  / Bili: x / Urobili: x   Blood: x / Protein: x / Nitrite: x   Leuk Esterase: x / RBC: x / WBC x   Sq Epi: x / Non Sq Epi: x / Bacteria: x              RADIOLOGY & ADDITIONAL STUDIES (The following images were personally reviewed):          A/p: 72 y/o F with PMHx of HTN, HLD, hypothyroid, CAD s/p stents x2 (2019), former smoker, and PSH of cervical spine ACDF, c sections x 2,  L knee surgery, rotator cuff surgery 10/2021, and ovarian cyst drainage, as well as asymptomatic known carotid stenosis, outpt duplex w/ worsened R ICA stenosis, here for elective R CEA. Now s/p R CEA (1/8). Transferred to SICU post operatively for frequent neuro checks and HD monitoring.    NEURO: neuro checks q1h, avoid narcotics. Tylenol PRN for pain. Hx of depression - continue Sertraline gabapentin takes tid at home will correct dosage.   HEENT: Neck supple no swelling noted.   CV: Goal -160. Hx of HTN - Restart losartan on dc home . Hx of HLD - continue atorvastatin. Hx of CAD - continue coreg, Diltiazem, ASA.   PULM: Goal maintain saturation > 94%. No pulmonary concerns  GI/FEN: CC reg diet  : Voids  ENDO: mISS. Hx of DMII - holding metformin. Hx of hypothyroidism - Continue Levothyroxine.   ID: Ancef perioperatively x3  PPX: SCDs   LINES: PIVs, R A-line (1/8-)  WOUNDS/DRAINS: R neck   PT/OT: OOB to chair  Dispo: DC home    Interval Events:  No events overnight   Patient seen and examined at bedside.      Allergies    No Known Allergies    Intolerances        Vital Signs Last 24 Hrs  T(C): 36.5 (09 Jan 2024 05:30), Max: 36.7 (08 Jan 2024 21:28)  T(F): 97.7 (09 Jan 2024 05:30), Max: 98 (08 Jan 2024 21:28)  HR: 60 (09 Jan 2024 07:00) (53 - 65)  BP: 135/86 (09 Jan 2024 07:00) (135/86 - 162/79)  BP(mean): 106 (09 Jan 2024 07:00) (102 - 114)  RR: 20 (09 Jan 2024 07:00) (15 - 34)  SpO2: 97% (09 Jan 2024 07:00) (93% - 100%)    Parameters below as of 09 Jan 2024 08:00  Patient On (Oxygen Delivery Method): room air        01-08 @ 07:01  -  01-09 @ 07:00  --------------------------------------------------------  IN: 750 mL / OUT: 925 mL / NET: -175 mL      01-08 @ 07:01  -  01-09 @ 07:00  --------------------------------------------------------  IN: 750 mL / OUT: 925 mL / NET: -175 mL        Physical Exam:     General: Resting comfortably in bed, NAD  Neuro: A&Ox3, Slight facial droop noted on R with tongue deviation to R on protrusion. Mild L facial droop also appreciated. No additional neuro deficits noted on complete exam.   HEENT: Neck supple. R neck incision noted with dressing CDI - no hematoma or swelling noted  Pulmonary: Nonlabored breathing, no respiratory distress or accessory muscle noted. Lungs clear to auscultation bilaterally  Cardiovascular: NSR  Abdomen: Soft, nondistended, nontender  Extremities WWP, B/L UE and LE strength noted to be 5/5  MAXIMUS: No joint swelling or erythema appreciated  Skin: Dry, no rashes  Psychiatric: Goal-oriented thought, normal affect      LABS:      CBC Full  -  ( 09 Jan 2024 03:01 )  WBC Count : 5.28 K/uL  RBC Count : 3.37 M/uL  Hemoglobin : 9.0 g/dL  Hematocrit : 27.8 %  Platelet Count - Automated : 194 K/uL  Mean Cell Volume : 82.5 fl  Mean Cell Hemoglobin : 26.7 pg  Mean Cell Hemoglobin Concentration : 32.4 gm/dL  Auto Neutrophil # : x  Auto Lymphocyte # : x  Auto Monocyte # : x  Auto Eosinophil # : x  Auto Basophil # : x  Auto Neutrophil % : x  Auto Lymphocyte % : x  Auto Monocyte % : x  Auto Eosinophil % : x  Auto Basophil % : x    01-09    139  |  105  |  15  ----------------------------<  104<H>  4.2   |  26  |  0.76    Ca    8.5      09 Jan 2024 03:01  Phos  3.6     01-09  Mg     2.0     01-09      PT/INR - ( 08 Jan 2024 11:39 )   PT: 12.7 sec;   INR: 1.12          PTT - ( 08 Jan 2024 11:39 )  PTT:26.4 sec      Urinalysis Basic - ( 09 Jan 2024 03:01 )    Color: x / Appearance: x / SG: x / pH: x  Gluc: 104 mg/dL / Ketone: x  / Bili: x / Urobili: x   Blood: x / Protein: x / Nitrite: x   Leuk Esterase: x / RBC: x / WBC x   Sq Epi: x / Non Sq Epi: x / Bacteria: x              RADIOLOGY & ADDITIONAL STUDIES (The following images were personally reviewed):          A/p: 74 y/o F with PMHx of HTN, HLD, hypothyroid, CAD s/p stents x2 (2019), former smoker, and PSH of cervical spine ACDF, c sections x 2,  L knee surgery, rotator cuff surgery 10/2021, and ovarian cyst drainage, as well as asymptomatic known carotid stenosis, outpt duplex w/ worsened R ICA stenosis, here for elective R CEA. Now s/p R CEA (1/8). Transferred to SICU post operatively for frequent neuro checks and HD monitoring.    NEURO: neuro checks q1h, avoid narcotics. Tylenol PRN for pain. Hx of depression - continue Sertraline gabapentin takes tid at home will correct dosage.   HEENT: Neck supple no swelling noted.   CV: Goal -160. Hx of HTN - Restart losartan on dc home . Hx of HLD - continue atorvastatin. Hx of CAD - continue coreg, Diltiazem, ASA.   PULM: Goal maintain saturation > 94%. No pulmonary concerns  GI/FEN: CC reg diet  : Voids  ENDO: mISS. Hx of DMII - holding metformin. Hx of hypothyroidism - Continue Levothyroxine.   ID: Ancef perioperatively x3  PPX: SCDs   LINES: PIVs, R A-line (1/8-)  WOUNDS/DRAINS: R neck   PT/OT: OOB to chair  Dispo: DC home    Interval Events:  No events overnight   Patient seen and examined at bedside.      Allergies    No Known Allergies    Intolerances        Vital Signs Last 24 Hrs  T(C): 36.5 (09 Jan 2024 05:30), Max: 36.7 (08 Jan 2024 21:28)  T(F): 97.7 (09 Jan 2024 05:30), Max: 98 (08 Jan 2024 21:28)  HR: 60 (09 Jan 2024 07:00) (53 - 65)  BP: 135/86 (09 Jan 2024 07:00) (135/86 - 162/79)  BP(mean): 106 (09 Jan 2024 07:00) (102 - 114)  RR: 20 (09 Jan 2024 07:00) (15 - 34)  SpO2: 97% (09 Jan 2024 07:00) (93% - 100%)    Parameters below as of 09 Jan 2024 08:00  Patient On (Oxygen Delivery Method): room air        01-08 @ 07:01  -  01-09 @ 07:00  --------------------------------------------------------  IN: 750 mL / OUT: 925 mL / NET: -175 mL      01-08 @ 07:01  -  01-09 @ 07:00  --------------------------------------------------------  IN: 750 mL / OUT: 925 mL / NET: -175 mL        Physical Exam:     General: Resting comfortably in bed, NAD  Neuro: A&Ox3, No neuro deficits  HEENT: Neck supple. R neck incision noted with dressing CDI - no hematoma or swelling noted  Pulmonary: Nonlabored breathing, no respiratory distress or accessory muscle noted. Lungs clear to auscultation bilaterally  Cardiovascular: NSR  Abdomen: Soft, nondistended, nontender  Extremities WWP, B/L UE and LE strength noted to be 5/5  MAXIMUS: No joint swelling or erythema appreciated  Skin: Dry, no rashes  Psychiatric: Goal-oriented thought, normal affect      LABS:      CBC Full  -  ( 09 Jan 2024 03:01 )  WBC Count : 5.28 K/uL  RBC Count : 3.37 M/uL  Hemoglobin : 9.0 g/dL  Hematocrit : 27.8 %  Platelet Count - Automated : 194 K/uL  Mean Cell Volume : 82.5 fl  Mean Cell Hemoglobin : 26.7 pg  Mean Cell Hemoglobin Concentration : 32.4 gm/dL  Auto Neutrophil # : x  Auto Lymphocyte # : x  Auto Monocyte # : x  Auto Eosinophil # : x  Auto Basophil # : x  Auto Neutrophil % : x  Auto Lymphocyte % : x  Auto Monocyte % : x  Auto Eosinophil % : x  Auto Basophil % : x    01-09    139  |  105  |  15  ----------------------------<  104<H>  4.2   |  26  |  0.76    Ca    8.5      09 Jan 2024 03:01  Phos  3.6     01-09  Mg     2.0     01-09      PT/INR - ( 08 Jan 2024 11:39 )   PT: 12.7 sec;   INR: 1.12          PTT - ( 08 Jan 2024 11:39 )  PTT:26.4 sec      Urinalysis Basic - ( 09 Jan 2024 03:01 )    Color: x / Appearance: x / SG: x / pH: x  Gluc: 104 mg/dL / Ketone: x  / Bili: x / Urobili: x   Blood: x / Protein: x / Nitrite: x   Leuk Esterase: x / RBC: x / WBC x   Sq Epi: x / Non Sq Epi: x / Bacteria: x              RADIOLOGY & ADDITIONAL STUDIES (The following images were personally reviewed):          A/p: 74 y/o F with PMHx of HTN, HLD, hypothyroid, CAD s/p stents x2 (2019), former smoker, and PSH of cervical spine ACDF, c sections x 2,  L knee surgery, rotator cuff surgery 10/2021, and ovarian cyst drainage, as well as asymptomatic known carotid stenosis, outpt duplex w/ worsened R ICA stenosis, here for elective R CEA. Now s/p R CEA (1/8). Transferred to SICU post operatively for frequent neuro checks and HD monitoring.    NEURO: neuro checks q1h, avoid narcotics. Tylenol PRN for pain. Hx of depression - continue Sertraline gabapentin takes tid at home will correct dosage.   HEENT: Neck supple no swelling noted.   CV: Goal -160. Hx of HTN - Restart losartan on dc home . Hx of HLD - continue atorvastatin. Hx of CAD - continue coreg and transition to bystolic on dc home Diltiazem, ASA.   PULM: Goal maintain saturation > 94%. No pulmonary concerns  GI/FEN: CC reg diet  : Voids  ENDO: mISS. Hx of DMII - holding metformin. Hx of hypothyroidism - Continue Levothyroxine.   ID: Ancef perioperatively x3  PPX: SCDs   LINES: PIVs, R A-line  Dc today   WOUNDS/DRAINS: R neck   PT/OT: OOB to chair  Dispo: DC home

## 2024-01-09 NOTE — DISCHARGE NOTE NURSING/CASE MANAGEMENT/SOCIAL WORK - NSDCPEFALRISK_GEN_ALL_CORE
For information on Fall & Injury Prevention, visit: [email or web address] OR  [email or web address] OR  https://www.cdc.gov/steadi/patient.html For information on Fall & Injury Prevention, visit: [email or web address] OR  [email or web address] OR  https://www.cdc.gov/steadi/patient.html

## 2024-01-09 NOTE — DISCHARGE NOTE PROVIDER - HOSPITAL COURSE
74 y/o F with PMHx of HTN, HLD, hypothyroid, CAD s/p stents x2 (2019), former smoker, and PSH of cervical spine ACDF, c sections x 2,  L knee surgery, rotator cuff surgery 10/2021, and ovarian cyst drainage, as well as asymptomatic known carotid stenosis, outpt duplex w/ worsened R ICA stenosis, presented for elective R CEA.On 1/8/24 she underwent right carotid endarterectomy and tolerated the procedure well. Post op she was transferred to SICU for frequent neuro checks and HD monitoring. The patient remained neurologically stable and maintained their BP within normal parameters without requiring any drips. On POD#1 patient is feeling well, her labs and vitals are within normal limits, neurologically intact, incisions clean and dry, she is tolerating diet, voiding and ambulating. Patient discharged home in stable condition with scheduled outpatient follow up in 3 days for stitches removal.   72 y/o F with PMHx of HTN, HLD, hypothyroid, CAD s/p stents x2 (2019), former smoker, and PSH of cervical spine ACDF, c sections x 2,  L knee surgery, rotator cuff surgery 10/2021, and ovarian cyst drainage, as well as asymptomatic known carotid stenosis, outpt duplex w/ worsened R ICA stenosis, presented for elective R CEA.On 1/8/24 she underwent right carotid endarterectomy and tolerated the procedure well. Post op she was transferred to SICU for frequent neuro checks and HD monitoring. The patient remained neurologically stable and maintained their BP within normal parameters without requiring any drips. On POD#1 patient is feeling well, her labs and vitals are within normal limits, neurologically intact, incisions clean and dry, she is tolerating diet, voiding and ambulating. Patient discharged home in stable condition with scheduled outpatient follow up in 3 days for stitches removal.

## 2024-01-09 NOTE — DISCHARGE NOTE PROVIDER - NSDCFUSCHEDAPPT_GEN_ALL_CORE_FT
[facility] Physician [facility]  VASCULAR [address]  Scheduled Appointment: 01/12/2024     [facility] Physician [facility]  VASCULAR [address]  Scheduled Appointment: 01/12/2024

## 2024-01-09 NOTE — DISCHARGE NOTE PROVIDER - DISCHARGE DIET
[Cough] : coughing [Difficulty Breathing During Exertion] : dyspnea on exertion [Feelings Of Weakness On Exertion] : exercise intolerance [___ Times a Week] : [unfilled] time(s) a week [Cold] : cold weather Regular Diet - No restrictions [URI] : upper respiratory tract infection [Pollen] : pollen exposure [Adherent] : the patient is adherent with ~his/her~ medication regimen [(# ___since the last visit)] : [unfilled] visits to the emergency room since the last visit [(# ___ in the past year)] : [unfilled] visits to the ICU in the past year [(# ___ since the last visit)] : hospitalized [unfilled] times since the last visit [( # ___ in the past year)] : intubated [unfilled] times in the past year [0 x/month] : 0 x/month [Minor Limitation] : minor limitation [< or = 2 days/wk] : < than or = 2 days/week [Home] : at home, [unfilled] , at the time of the visit. [Other Location: e.g. Home (Enter Location, City,State)___] : at [unfilled] [Mother] : mother [Stable] : are stable [None] : The patient is currently asymptomatic [0 - 1/year] : 0 - 1/year [More Frequent Use Needed Recently] : Patient reports no recent increase in frequency of [de-identified] : as above. [de-identified] : currently has no cough, SOB on occasion. [de-identified] : none. [de-identified] : SOB, cough - not doing a lot of exercise. [Shortness of Breath] : no shortness of breath [Dyspnea on Exertion] : no dyspnea on exertion [Cough] : no cough [FreeTextEntry1] : chest tightness/difficulty breathing on occasion.

## 2024-01-09 NOTE — DISCHARGE NOTE PROVIDER - CARE PROVIDER_API CALL
[facility]  [address]  [place] [address]  Phone: [phone]  Fax: [phone]  Scheduled Appointment: 01/12/2024 09:00 AM   [facility]  [address]  [place] [address]  Phone: [phone]  Fax: [phone]  Scheduled Appointment: 01/12/2024 09:00 AM

## 2024-01-09 NOTE — DISCHARGE NOTE PROVIDER - NSDCCPCAREPLAN_GEN_ALL_CORE_FT
PRINCIPAL DISCHARGE DIAGNOSIS  Diagnosis: Carotid stenosis  Assessment and Plan of Treatment:       SECONDARY DISCHARGE DIAGNOSES  Diagnosis: Diabetes  Assessment and Plan of Treatment:     Diagnosis: Degenerative arthropathy  Assessment and Plan of Treatment:     Diagnosis: Anxiety  Assessment and Plan of Treatment:     Diagnosis: HTN (hypertension)  Assessment and Plan of Treatment:     Diagnosis: HLD (hyperlipidemia)  Assessment and Plan of Treatment:     Diagnosis: GERD (gastroesophageal reflux disease)  Assessment and Plan of Treatment:     Diagnosis: COPD, mild  Assessment and Plan of Treatment:     Diagnosis: Hypothyroidism  Assessment and Plan of Treatment:     Diagnosis: CAD (coronary artery disease)  Assessment and Plan of Treatment:     Diagnosis: S/P removal of ovarian cyst  Assessment and Plan of Treatment:

## 2024-01-09 NOTE — DISCHARGE NOTE PROVIDER - CARE PROVIDERS DIRECT ADDRESSES
[email or web address]

## 2024-01-11 LAB
SURGICAL PATHOLOGY STUDY: SIGNIFICANT CHANGE UP
SURGICAL PATHOLOGY STUDY: SIGNIFICANT CHANGE UP

## 2024-01-12 ENCOUNTER — APPOINTMENT (OUTPATIENT)
Dept: VASCULAR SURGERY | Facility: CLINIC | Age: 74
End: 2024-01-12
Payer: MEDICARE

## 2024-01-12 VITALS
SYSTOLIC BLOOD PRESSURE: 152 MMHG | HEART RATE: 67 BPM | BODY MASS INDEX: 31.58 KG/M2 | WEIGHT: 185 LBS | DIASTOLIC BLOOD PRESSURE: 76 MMHG | HEIGHT: 64 IN

## 2024-01-12 DIAGNOSIS — Z87.891 PERSONAL HISTORY OF NICOTINE DEPENDENCE: ICD-10-CM

## 2024-01-12 DIAGNOSIS — I65.23 OCCLUSION AND STENOSIS OF BILATERAL CAROTID ARTERIES: ICD-10-CM

## 2024-01-12 PROCEDURE — 99024 POSTOP FOLLOW-UP VISIT: CPT

## 2024-01-12 NOTE — REASON FOR VISIT
[de-identified] : NELY OLIVERA [de-identified] : 1/8/24 [de-identified] : 74 y/o F with PMHx of HTN, HLD, CAD s/p stents x2, former smoker, and PSH of cervical spine, knee and ovarian surgeries, as well as known carotid stenosis, asymptomatic. The right sided stenosis of the carotid progressed >80% and she underwent R CEA on 1/8/24. She was shunt dependent during procedure but postop course was without any complications. She was discharged POD1. Today, she reports feeling well expect localized pain in the neck area, especially towards the bottom of the incision. She also reports swelling and numbness around the incision. Denies any neurological symptoms except intermittent headaches. She has been taking her meds as prescribed, including statin and ASA. No trouble breathing or swallowing. SHe does report some burning sensation int eh throat but has been slowly improving.  Accompanied by .  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

## 2024-01-12 NOTE — DISCUSSION/SUMMARY
[FreeTextEntry1] : 74 y/o F w/ asymptomatic carotid stenosis R>L. Now, s/p R CEA 1/8/24. On exam, no focal neurological deficits. R neck incision healing well, mild swelling, mild ecchymosis, stitches in place, no sign of infection or drainage. All sutures were removed and steri strips applied. Steri strips to fall off on their own or pt to remove after 7 days. Shower daily, allow soap and water to run over the incision and pad dry. No more peroxide needed. Continue to stay active and take statin/asa. Monitor BP at home and visit Dr Matthews. F/u in 3 months. SHe knows to call wiht any questions or concerns.

## 2024-01-12 NOTE — PHYSICAL EXAM
[Respiratory Effort] : normal respiratory effort [Right Carotid Bruit] : no bruit heard over the right carotid [Left Carotid Bruit] : no bruit heard over the left carotid [2+] : left 2+ [No Rash or Lesion] : No rash or lesion [Alert] : alert [Oriented to Person] : oriented to person [Oriented to Place] : oriented to place [Oriented to Time] : oriented to time [Calm] : calm [Ankle Swelling (On Exam)] : not present [Varicose Veins Of Lower Extremities] : not present [] : not present [de-identified] : WN/WD [FreeTextEntry1] : No focal neurological deficits. R neck incision healing well, mild swelling, mild ecchymosis, stitches in place, no sign of infection or drainage [de-identified] : FROM

## 2024-01-16 PROBLEM — E11.9 TYPE 2 DIABETES MELLITUS WITHOUT COMPLICATIONS: Chronic | Status: ACTIVE | Noted: 2024-01-05

## 2024-01-22 DIAGNOSIS — Z79.84 LONG TERM (CURRENT) USE OF ORAL HYPOGLYCEMIC DRUGS: ICD-10-CM

## 2024-01-22 DIAGNOSIS — K21.9 GASTRO-ESOPHAGEAL REFLUX DISEASE WITHOUT ESOPHAGITIS: ICD-10-CM

## 2024-01-22 DIAGNOSIS — Z87.891 PERSONAL HISTORY OF NICOTINE DEPENDENCE: ICD-10-CM

## 2024-01-22 DIAGNOSIS — I65.21 OCCLUSION AND STENOSIS OF RIGHT CAROTID ARTERY: ICD-10-CM

## 2024-01-22 DIAGNOSIS — I25.10 ATHEROSCLEROTIC HEART DISEASE OF NATIVE CORONARY ARTERY WITHOUT ANGINA PECTORIS: ICD-10-CM

## 2024-01-22 DIAGNOSIS — Z79.82 LONG TERM (CURRENT) USE OF ASPIRIN: ICD-10-CM

## 2024-01-22 DIAGNOSIS — F41.9 ANXIETY DISORDER, UNSPECIFIED: ICD-10-CM

## 2024-01-22 DIAGNOSIS — Z98.1 ARTHRODESIS STATUS: ICD-10-CM

## 2024-01-22 DIAGNOSIS — E78.00 PURE HYPERCHOLESTEROLEMIA, UNSPECIFIED: ICD-10-CM

## 2024-01-22 DIAGNOSIS — Z95.5 PRESENCE OF CORONARY ANGIOPLASTY IMPLANT AND GRAFT: ICD-10-CM

## 2024-01-22 DIAGNOSIS — E11.9 TYPE 2 DIABETES MELLITUS WITHOUT COMPLICATIONS: ICD-10-CM

## 2024-01-22 DIAGNOSIS — J44.9 CHRONIC OBSTRUCTIVE PULMONARY DISEASE, UNSPECIFIED: ICD-10-CM

## 2024-01-22 DIAGNOSIS — Q27.9 CONGENITAL MALFORMATION OF PERIPHERAL VASCULAR SYSTEM, UNSPECIFIED: ICD-10-CM

## 2024-01-22 DIAGNOSIS — F32.A DEPRESSION, UNSPECIFIED: ICD-10-CM

## 2024-01-22 DIAGNOSIS — M19.90 UNSPECIFIED OSTEOARTHRITIS, UNSPECIFIED SITE: ICD-10-CM

## 2024-01-22 DIAGNOSIS — I10 ESSENTIAL (PRIMARY) HYPERTENSION: ICD-10-CM

## 2024-01-22 DIAGNOSIS — E03.9 HYPOTHYROIDISM, UNSPECIFIED: ICD-10-CM

## 2024-01-22 DIAGNOSIS — Z79.890 HORMONE REPLACEMENT THERAPY: ICD-10-CM

## 2024-07-18 ENCOUNTER — APPOINTMENT (OUTPATIENT)
Dept: ORTHOPEDIC SURGERY | Facility: CLINIC | Age: 74
End: 2024-07-18
Payer: MEDICARE

## 2024-07-18 DIAGNOSIS — M25.512 PAIN IN LEFT SHOULDER: ICD-10-CM

## 2024-07-18 PROCEDURE — 99213 OFFICE O/P EST LOW 20 MIN: CPT

## 2024-07-18 PROCEDURE — 73030 X-RAY EXAM OF SHOULDER: CPT | Mod: LT

## 2024-10-29 ENCOUNTER — APPOINTMENT (OUTPATIENT)
Dept: GASTROENTEROLOGY | Facility: CLINIC | Age: 74
End: 2024-10-29
Payer: MEDICARE

## 2024-10-29 VITALS
HEART RATE: 61 BPM | WEIGHT: 185 LBS | TEMPERATURE: 97.5 F | OXYGEN SATURATION: 98 % | SYSTOLIC BLOOD PRESSURE: 121 MMHG | HEIGHT: 64 IN | DIASTOLIC BLOOD PRESSURE: 69 MMHG | BODY MASS INDEX: 31.58 KG/M2

## 2024-10-29 DIAGNOSIS — D64.9 ANEMIA, UNSPECIFIED: ICD-10-CM

## 2024-10-29 DIAGNOSIS — Z86.39 PERSONAL HISTORY OF OTHER ENDOCRINE, NUTRITIONAL AND METABOLIC DISEASE: ICD-10-CM

## 2024-10-29 DIAGNOSIS — Z86.79 PERSONAL HISTORY OF OTHER DISEASES OF THE CIRCULATORY SYSTEM: ICD-10-CM

## 2024-10-29 DIAGNOSIS — Z12.11 ENCOUNTER FOR SCREENING FOR MALIGNANT NEOPLASM OF COLON: ICD-10-CM

## 2024-10-29 PROCEDURE — 99204 OFFICE O/P NEW MOD 45 MIN: CPT

## 2024-10-29 RX ORDER — TIRZEPATIDE 10 MG/.5ML
10 INJECTION, SOLUTION SUBCUTANEOUS
Refills: 0 | Status: ACTIVE | COMMUNITY

## 2024-10-29 RX ORDER — SODIUM SULFATE, POTASSIUM SULFATE AND MAGNESIUM SULFATE 1.6; 3.13; 17.5 G/177ML; G/177ML; G/177ML
17.5-3.13-1.6 SOLUTION ORAL
Qty: 1 | Refills: 0 | Status: ACTIVE | COMMUNITY
Start: 2024-10-29 | End: 1900-01-01

## 2025-04-16 ENCOUNTER — NON-APPOINTMENT (OUTPATIENT)
Age: 75
End: 2025-04-16

## 2025-04-17 ENCOUNTER — LABORATORY RESULT (OUTPATIENT)
Age: 75
End: 2025-04-17

## 2025-04-17 ENCOUNTER — APPOINTMENT (OUTPATIENT)
Dept: CARDIOLOGY | Facility: CLINIC | Age: 75
End: 2025-04-17
Payer: MEDICARE

## 2025-04-17 VITALS
SYSTOLIC BLOOD PRESSURE: 120 MMHG | HEIGHT: 64 IN | BODY MASS INDEX: 26.63 KG/M2 | TEMPERATURE: 97.5 F | WEIGHT: 156 LBS | HEART RATE: 70 BPM | OXYGEN SATURATION: 98 % | DIASTOLIC BLOOD PRESSURE: 64 MMHG

## 2025-04-17 DIAGNOSIS — Z12.39 ENCOUNTER FOR OTHER SCREENING FOR MALIGNANT NEOPLASM OF BREAST: ICD-10-CM

## 2025-04-17 DIAGNOSIS — E11.9 TYPE 2 DIABETES MELLITUS W/OUT COMPLICATIONS: ICD-10-CM

## 2025-04-17 DIAGNOSIS — I10 ESSENTIAL (PRIMARY) HYPERTENSION: ICD-10-CM

## 2025-04-17 DIAGNOSIS — I25.10 ATHEROSCLEROTIC HEART DISEASE OF NATIVE CORONARY ARTERY W/OUT ANGINA PECTORIS: ICD-10-CM

## 2025-04-17 DIAGNOSIS — E78.5 HYPERLIPIDEMIA, UNSPECIFIED: ICD-10-CM

## 2025-04-17 DIAGNOSIS — K76.0 FATTY (CHANGE OF) LIVER, NOT ELSEWHERE CLASSIFIED: ICD-10-CM

## 2025-04-17 DIAGNOSIS — M51.369: ICD-10-CM

## 2025-04-17 DIAGNOSIS — Z13.820 ENCOUNTER FOR SCREENING FOR OSTEOPOROSIS: ICD-10-CM

## 2025-04-17 DIAGNOSIS — Z01.818 ENCOUNTER FOR OTHER PREPROCEDURAL EXAMINATION: ICD-10-CM

## 2025-04-17 DIAGNOSIS — I65.23 OCCLUSION AND STENOSIS OF BILATERAL CAROTID ARTERIES: ICD-10-CM

## 2025-04-17 DIAGNOSIS — M48.02 SPINAL STENOSIS, CERVICAL REGION: ICD-10-CM

## 2025-04-17 DIAGNOSIS — D64.9 ANEMIA, UNSPECIFIED: ICD-10-CM

## 2025-04-17 DIAGNOSIS — Z12.11 ENCOUNTER FOR SCREENING FOR MALIGNANT NEOPLASM OF COLON: ICD-10-CM

## 2025-04-17 DIAGNOSIS — E66.9 OBESITY, UNSPECIFIED: ICD-10-CM

## 2025-04-17 DIAGNOSIS — F41.9 ANXIETY DISORDER, UNSPECIFIED: ICD-10-CM

## 2025-04-17 DIAGNOSIS — Z13.228 ENCOUNTER FOR SCREENING FOR OTHER METABOLIC DISORDERS: ICD-10-CM

## 2025-04-17 DIAGNOSIS — E03.9 HYPOTHYROIDISM, UNSPECIFIED: ICD-10-CM

## 2025-04-17 DIAGNOSIS — R94.31 ABNORMAL ELECTROCARDIOGRAM [ECG] [EKG]: ICD-10-CM

## 2025-04-17 DIAGNOSIS — K21.9 GASTRO-ESOPHAGEAL REFLUX DISEASE W/OUT ESOPHAGITIS: ICD-10-CM

## 2025-04-17 PROCEDURE — 93000 ELECTROCARDIOGRAM COMPLETE: CPT | Mod: NC

## 2025-04-17 PROCEDURE — 99204 OFFICE O/P NEW MOD 45 MIN: CPT | Mod: 25

## 2025-04-17 RX ORDER — FENOFIBRATE 134 MG/1
134 CAPSULE ORAL
Refills: 0 | Status: ACTIVE | COMMUNITY
Start: 2025-04-17

## 2025-04-17 RX ORDER — HYDROCHLOROTHIAZIDE 25 MG/1
25 TABLET ORAL
Refills: 0 | Status: ACTIVE | COMMUNITY
Start: 2025-04-17

## 2025-04-18 LAB
25(OH)D3 SERPL-MCNC: 47.5 NG/ML
ALBUMIN SERPL ELPH-MCNC: 4.2 G/DL
ALP BLD-CCNC: 55 U/L
ALT SERPL-CCNC: 8 U/L
ANION GAP SERPL CALC-SCNC: 13 MMOL/L
APPEARANCE: CLEAR
AST SERPL-CCNC: 21 U/L
BACTERIA: NEGATIVE /HPF
BASOPHILS # BLD AUTO: 0.03 K/UL
BASOPHILS NFR BLD AUTO: 0.5 %
BILIRUB SERPL-MCNC: <0.2 MG/DL
BILIRUBIN URINE: NEGATIVE
BLOOD URINE: NEGATIVE
BUN SERPL-MCNC: 21 MG/DL
CALCIUM SERPL-MCNC: 9.1 MG/DL
CAST: 2 /LPF
CHLORIDE SERPL-SCNC: 105 MMOL/L
CHOLEST SERPL-MCNC: 153 MG/DL
CK SERPL-CCNC: 108 U/L
CO2 SERPL-SCNC: 23 MMOL/L
COLOR: YELLOW
CREAT SERPL-MCNC: 0.9 MG/DL
EGFRCR SERPLBLD CKD-EPI 2021: 67 ML/MIN/1.73M2
EOSINOPHIL # BLD AUTO: 0.08 K/UL
EOSINOPHIL NFR BLD AUTO: 1.4 %
EPITHELIAL CELLS: 0 /HPF
ESTIMATED AVERAGE GLUCOSE: 114 MG/DL
FERRITIN SERPL-MCNC: 6 NG/ML
FOLATE SERPL-MCNC: >20 NG/ML
GGT SERPL-CCNC: 11 U/L
GLUCOSE QUALITATIVE U: NEGATIVE MG/DL
GLUCOSE SERPL-MCNC: 78 MG/DL
HBA1C MFR BLD HPLC: 5.6 %
HCT VFR BLD CALC: 30.9 %
HDLC SERPL-MCNC: 68 MG/DL
HGB BLD-MCNC: 9.6 G/DL
IMM GRANULOCYTES NFR BLD AUTO: 0.4 %
IRON SATN MFR SERPL: 5 %
IRON SERPL-MCNC: 26 UG/DL
KETONES URINE: NEGATIVE MG/DL
LDLC SERPL-MCNC: 70 MG/DL
LEUKOCYTE ESTERASE URINE: ABNORMAL
LYMPHOCYTES # BLD AUTO: 2.19 K/UL
LYMPHOCYTES NFR BLD AUTO: 39.6 %
MAGNESIUM SERPL-MCNC: 2 MG/DL
MAN DIFF?: NORMAL
MCHC RBC-ENTMCNC: 26 PG
MCHC RBC-ENTMCNC: 31.1 G/DL
MCV RBC AUTO: 83.7 FL
MICROSCOPIC-UA: NORMAL
MONOCYTES # BLD AUTO: 0.34 K/UL
MONOCYTES NFR BLD AUTO: 6.1 %
NEUTROPHILS # BLD AUTO: 2.87 K/UL
NEUTROPHILS NFR BLD AUTO: 52 %
NITRITE URINE: NEGATIVE
NONHDLC SERPL-MCNC: 85 MG/DL
PH URINE: 5.5
PHOSPHATE SERPL-MCNC: 3.7 MG/DL
PLATELET # BLD AUTO: 237 K/UL
POTASSIUM SERPL-SCNC: 3.9 MMOL/L
PROT SERPL-MCNC: 7 G/DL
PROTEIN URINE: NEGATIVE MG/DL
RBC # BLD: 3.69 M/UL
RBC # FLD: 15.1 %
RED BLOOD CELLS URINE: 1 /HPF
SODIUM SERPL-SCNC: 141 MMOL/L
SPECIFIC GRAVITY URINE: 1.02
T4 FREE SERPL-MCNC: 1.3 NG/DL
TIBC SERPL-MCNC: 561 UG/DL
TRANSFERRIN SERPL-MCNC: 443 MG/DL
TRIGL SERPL-MCNC: 78 MG/DL
TSH SERPL-ACNC: 3.54 UIU/ML
UIBC SERPL-MCNC: 535 UG/DL
UROBILINOGEN URINE: 0.2 MG/DL
VIT B12 SERPL-MCNC: 611 PG/ML
WBC # FLD AUTO: 5.53 K/UL
WHITE BLOOD CELLS URINE: 0 /HPF

## 2025-04-18 RX ORDER — MULTIVIT-MIN/FOLIC/VIT K/LYCOP 400-300MCG
500 TABLET ORAL
Qty: 90 | Refills: 1 | Status: ACTIVE | COMMUNITY
Start: 2025-04-18 | End: 1900-01-01

## 2025-04-19 ENCOUNTER — APPOINTMENT (OUTPATIENT)
Dept: CARDIOLOGY | Facility: CLINIC | Age: 75
End: 2025-04-19
Payer: MEDICARE

## 2025-04-19 PROCEDURE — 93880 EXTRACRANIAL BILAT STUDY: CPT

## 2025-04-19 PROCEDURE — 93306 TTE W/DOPPLER COMPLETE: CPT

## 2025-04-22 RX ORDER — IRON POLYSACCHARIDE COMPLEX 150 MG
150 CAPSULE ORAL
Qty: 15 | Refills: 0 | Status: ACTIVE | COMMUNITY
Start: 2025-04-18 | End: 1900-01-01

## 2025-04-23 ENCOUNTER — NON-APPOINTMENT (OUTPATIENT)
Age: 75
End: 2025-04-23

## 2025-04-25 ENCOUNTER — NON-APPOINTMENT (OUTPATIENT)
Age: 75
End: 2025-04-25

## 2025-05-02 ENCOUNTER — APPOINTMENT (OUTPATIENT)
Dept: CARDIOLOGY | Facility: CLINIC | Age: 75
End: 2025-05-02
Payer: MEDICARE

## 2025-05-02 ENCOUNTER — NON-APPOINTMENT (OUTPATIENT)
Age: 75
End: 2025-05-02

## 2025-05-02 DIAGNOSIS — I25.10 ATHEROSCLEROTIC HEART DISEASE OF NATIVE CORONARY ARTERY W/OUT ANGINA PECTORIS: ICD-10-CM

## 2025-05-02 PROCEDURE — A9500: CPT

## 2025-05-02 PROCEDURE — 93015 CV STRESS TEST SUPVJ I&R: CPT

## 2025-05-02 PROCEDURE — 78452 HT MUSCLE IMAGE SPECT MULT: CPT

## 2025-05-05 ENCOUNTER — NON-APPOINTMENT (OUTPATIENT)
Age: 75
End: 2025-05-05

## 2025-05-21 ENCOUNTER — APPOINTMENT (OUTPATIENT)
Dept: CARDIOLOGY | Facility: CLINIC | Age: 75
End: 2025-05-21

## 2025-05-22 ENCOUNTER — APPOINTMENT (OUTPATIENT)
Dept: GASTROENTEROLOGY | Facility: AMBULATORY MEDICAL SERVICES | Age: 75
End: 2025-05-22
Payer: MEDICARE

## 2025-05-22 PROCEDURE — 43239 EGD BIOPSY SINGLE/MULTIPLE: CPT | Mod: 59

## 2025-05-22 PROCEDURE — 45380 COLONOSCOPY AND BIOPSY: CPT | Mod: PT

## 2025-06-18 ENCOUNTER — APPOINTMENT (OUTPATIENT)
Dept: GASTROENTEROLOGY | Facility: CLINIC | Age: 75
End: 2025-06-18
Payer: MEDICARE

## 2025-06-18 VITALS
HEART RATE: 63 BPM | WEIGHT: 151 LBS | BODY MASS INDEX: 25.78 KG/M2 | HEIGHT: 64 IN | DIASTOLIC BLOOD PRESSURE: 88 MMHG | OXYGEN SATURATION: 97 % | TEMPERATURE: 97.6 F | SYSTOLIC BLOOD PRESSURE: 159 MMHG

## 2025-06-18 PROBLEM — D12.6 TUBULAR ADENOMA OF COLON: Status: ACTIVE | Noted: 2025-06-18

## 2025-06-18 PROBLEM — K29.50 CHRONIC GASTRITIS: Status: ACTIVE | Noted: 2025-06-18

## 2025-06-18 PROCEDURE — 99214 OFFICE O/P EST MOD 30 MIN: CPT

## 2025-06-18 PROCEDURE — G2211 COMPLEX E/M VISIT ADD ON: CPT

## 2025-06-18 RX ORDER — FAMOTIDINE 40 MG/1
40 TABLET, FILM COATED ORAL
Qty: 90 | Refills: 1 | Status: ACTIVE | COMMUNITY
Start: 2025-06-18 | End: 1900-01-01

## 2025-06-18 RX ORDER — OMEPRAZOLE 40 MG/1
40 CAPSULE, DELAYED RELEASE ORAL
Qty: 90 | Refills: 1 | Status: ACTIVE | COMMUNITY
Start: 2025-06-18 | End: 1900-01-01

## 2025-07-17 NOTE — REASON FOR VISIT
This sounds like it is an abdominal wall hernia. We could get him in non-urgently to confirm with an exam +/- imaging. Given it is not currently causing problems, can continue to wear velcro support, especially when lifting heavy objects and follow if worsening or painful.     Thank you,  Kin Copeland    [Follow-Up Visit] : a follow-up visit for [Aftercare Following Surgery] : aftercare following surgery

## 2025-09-18 ENCOUNTER — RESULT REVIEW (OUTPATIENT)
Age: 75
End: 2025-09-18

## 2025-09-18 ENCOUNTER — APPOINTMENT (OUTPATIENT)
Dept: HEMATOLOGY ONCOLOGY | Facility: CLINIC | Age: 75
End: 2025-09-18
Payer: MEDICARE

## 2025-09-18 VITALS
HEART RATE: 71 BPM | RESPIRATION RATE: 17 BRPM | WEIGHT: 149 LBS | OXYGEN SATURATION: 98 % | TEMPERATURE: 97.4 F | BODY MASS INDEX: 25.44 KG/M2 | DIASTOLIC BLOOD PRESSURE: 81 MMHG | SYSTOLIC BLOOD PRESSURE: 165 MMHG | HEIGHT: 64 IN

## 2025-09-18 DIAGNOSIS — D64.9 ANEMIA, UNSPECIFIED: ICD-10-CM

## 2025-09-18 LAB
ALBUMIN SERPL ELPH-MCNC: 4.6 G/DL
ALP BLD-CCNC: 60 U/L
ALT SERPL-CCNC: 14 U/L
ANION GAP SERPL CALC-SCNC: 13 MMOL/L
AST SERPL-CCNC: 25 U/L
BILIRUB SERPL-MCNC: 0.2 MG/DL
BUN SERPL-MCNC: 19 MG/DL
CALCIUM SERPL-MCNC: 9.6 MG/DL
CHLORIDE SERPL-SCNC: 101 MMOL/L
CO2 SERPL-SCNC: 23 MMOL/L
CREAT SERPL-MCNC: 0.71 MG/DL
EGFRCR SERPLBLD CKD-EPI 2021: 88 ML/MIN/1.73M2
FERRITIN SERPL-MCNC: 7 NG/ML
GLUCOSE SERPL-MCNC: 81 MG/DL
HAPTOGLOB SERPL-MCNC: 66 MG/DL
IRON SATN MFR SERPL: 6 %
IRON SERPL-MCNC: 34 UG/DL
LDH SERPL-CCNC: 204 U/L
POTASSIUM SERPL-SCNC: 4.3 MMOL/L
PROT SERPL-MCNC: 7.5 G/DL
SODIUM SERPL-SCNC: 137 MMOL/L
TIBC SERPL-MCNC: 596 UG/DL
UIBC SERPL-MCNC: 562 UG/DL

## 2025-09-18 PROCEDURE — 99204 OFFICE O/P NEW MOD 45 MIN: CPT

## 2025-09-26 LAB
ALBUMIN MFR SERPL ELPH: 57.8 %
ALBUMIN SERPL-MCNC: 4.4 G/DL
ALBUMIN/GLOB SERPL: 1.4 RATIO
ALPHA1 GLOB MFR SERPL ELPH: 4 %
ALPHA1 GLOB SERPL ELPH-MCNC: 0.3 G/DL
ALPHA2 GLOB MFR SERPL ELPH: 9.7 %
ALPHA2 GLOB SERPL ELPH-MCNC: 0.7 G/DL
B-GLOBULIN MFR SERPL ELPH: 14.3 %
B-GLOBULIN SERPL ELPH-MCNC: 1.1 G/DL
DEPRECATED KAPPA LC FREE/LAMBDA SER: 1.26 RATIO
GAMMA GLOB FLD ELPH-MCNC: 1.1 G/DL
GAMMA GLOB MFR SERPL ELPH: 14.2 %
IGA SERPL-MCNC: 216 MG/DL
IGG SERPL-MCNC: 1055 MG/DL
IGM SERPL-MCNC: 58 MG/DL
INTERPRETATION SERPL IEP-IMP: NORMAL
KAPPA LC CSF-MCNC: 1.57 MG/DL
KAPPA LC SERPL-MCNC: 1.98 MG/DL
M PROTEIN SPEC IFE-MCNC: NORMAL
PROT SERPL-MCNC: 7.6 G/DL
PROT SERPL-MCNC: 7.6 G/DL

## (undated) DEVICE — GLV 7.5 PROTEXIS (WHITE)

## (undated) DEVICE — STAPLER SKIN PROXIMATE

## (undated) DEVICE — POSITIONER FOAM HEAD DONUT 9" (PINK)

## (undated) DEVICE — SUT GORETEX CV-6 (5-0) 30" TTC-12

## (undated) DEVICE — SUT PROLENE 6-0 30" BV

## (undated) DEVICE — SYR ASEPTO

## (undated) DEVICE — SUT ETHIBOND 2-0 18" TIES

## (undated) DEVICE — CANISTER SPECIMEN CONVERTOR PLASTIC

## (undated) DEVICE — SUT PROLENE 6-0 30" C-1

## (undated) DEVICE — PACK VASCULAR MINOR

## (undated) DEVICE — SUT ETHILON 3-0 30" KS

## (undated) DEVICE — WARMING BLANKET LOWER ADULT

## (undated) DEVICE — SPONGE PEANUT AUTO COUNT

## (undated) DEVICE — SUT PROLENE 7-0 18" BV

## (undated) DEVICE — SUT GORETEX CV-6 (5-0) 30" TTC-9

## (undated) DEVICE — DRAPE THYROID 77" X 123"

## (undated) DEVICE — SYR LUER LOK 10CC

## (undated) DEVICE — SUT PROLENE 5-0 24" C-1

## (undated) DEVICE — DRAPE IOBAN 13" X 13"

## (undated) DEVICE — NDL HYPO REGULAR BEVEL 25G X 1.5" (BLUE)

## (undated) DEVICE — SUT ETHIBOND 4-0 12-18"

## (undated) DEVICE — FRAZIER SUCTION TIP 8FR

## (undated) DEVICE — DRAPE MAGNETIC INSTRUMENT MEDIUM

## (undated) DEVICE — SUT SILK 2-0 18" SH (POP-OFF)